# Patient Record
Sex: FEMALE | Race: WHITE | NOT HISPANIC OR LATINO | URBAN - METROPOLITAN AREA
[De-identification: names, ages, dates, MRNs, and addresses within clinical notes are randomized per-mention and may not be internally consistent; named-entity substitution may affect disease eponyms.]

---

## 2017-02-05 ENCOUNTER — EMERGENCY (EMERGENCY)
Facility: HOSPITAL | Age: 72
LOS: 1 days | Discharge: HOME | End: 2017-02-05
Attending: EMERGENCY MEDICINE | Admitting: EMERGENCY MEDICINE
Payer: MEDICARE

## 2017-02-05 VITALS
DIASTOLIC BLOOD PRESSURE: 98 MMHG | TEMPERATURE: 99 F | SYSTOLIC BLOOD PRESSURE: 228 MMHG | OXYGEN SATURATION: 98 % | RESPIRATION RATE: 18 BRPM | HEART RATE: 96 BPM

## 2017-02-05 DIAGNOSIS — R04.0 EPISTAXIS: ICD-10-CM

## 2017-02-05 PROCEDURE — 30901 CONTROL OF NOSEBLEED: CPT | Mod: LT

## 2017-02-05 PROCEDURE — 99283 EMERGENCY DEPT VISIT LOW MDM: CPT | Mod: 25

## 2017-02-05 PROCEDURE — 30901 CONTROL OF NOSEBLEED: CPT

## 2017-02-05 NOTE — ED ADULT NURSE NOTE - OBJECTIVE STATEMENT
71 year old female patient presents to ED with hypertension and epistaxis on Plavix x 1.5 hours. Patient reports previous episode 5 months ago which required cauterization. Patient denies headache, change in vision, chest pain, SOB, n/v/d, numbness or tingling. Patient advised to hold firm pressure to nose and cold pack applied to bridge of nose.  at bedside.

## 2017-02-05 NOTE — ED ADULT NURSE NOTE - PMH
Diabetes Mellitus Type II    HLD (Hyperlipidemia)    HTN (Hypertension)    Obesity    Peripheral Vascular Disease  lower extremity

## 2017-02-05 NOTE — ED ADULT NURSE NOTE - PSH
Left femoral poplteal bypass graft 11/2011    PVD (Peripheral Vascular Disease)  angiogram of left lower extremity in 2010, nov 2011  S/P Appendectomy  35 years ago

## 2017-02-06 VITALS
SYSTOLIC BLOOD PRESSURE: 122 MMHG | DIASTOLIC BLOOD PRESSURE: 79 MMHG | RESPIRATION RATE: 16 BRPM | OXYGEN SATURATION: 99 % | HEART RATE: 76 BPM

## 2017-02-06 NOTE — ED PROVIDER NOTE - MEDICAL DECISION MAKING DETAILS
Frances: left nare epistaxis. will hold pressure and evaluate if needs packing versus cautery. on plavix. no coumadin.

## 2017-02-06 NOTE — ED PROVIDER NOTE - PROGRESS NOTE DETAILS
Held pressure for 7 minutes, bleeding stopped. Had patient blow out remaing clots from left nare. Placed spidget with norsynephrine in left nare Left nare clear, small visible end vessel. Will cauterize Applied bacitracin to left nare. NO additional bleeding. bp improved.

## 2017-02-06 NOTE — ED PROVIDER NOTE - OBJECTIVE STATEMENT
71 year old female with left nare epistaxis one time yesterday and restarted again today. On plavix.  Nos ob, no dizziness, no cp, not swallowing blood. No lightheaded.

## 2017-07-14 ENCOUNTER — APPOINTMENT (OUTPATIENT)
Dept: CARDIOLOGY | Facility: CLINIC | Age: 72
End: 2017-07-14

## 2017-07-14 VITALS
HEIGHT: 65 IN | WEIGHT: 160 LBS | OXYGEN SATURATION: 99 % | BODY MASS INDEX: 26.66 KG/M2 | DIASTOLIC BLOOD PRESSURE: 68 MMHG | SYSTOLIC BLOOD PRESSURE: 159 MMHG | HEART RATE: 62 BPM

## 2017-08-18 ENCOUNTER — APPOINTMENT (OUTPATIENT)
Dept: CARDIOLOGY | Facility: CLINIC | Age: 72
End: 2017-08-18
Payer: MEDICARE

## 2017-08-18 ENCOUNTER — OUTPATIENT (OUTPATIENT)
Dept: OUTPATIENT SERVICES | Facility: HOSPITAL | Age: 72
LOS: 1 days | End: 2017-08-18
Payer: MEDICARE

## 2017-08-18 VITALS
OXYGEN SATURATION: 97 % | BODY MASS INDEX: 26.66 KG/M2 | DIASTOLIC BLOOD PRESSURE: 66 MMHG | HEART RATE: 74 BPM | WEIGHT: 160 LBS | SYSTOLIC BLOOD PRESSURE: 131 MMHG | HEIGHT: 65 IN

## 2017-08-18 DIAGNOSIS — I73.9 PERIPHERAL VASCULAR DISEASE, UNSPECIFIED: ICD-10-CM

## 2017-08-18 PROCEDURE — 93925 LOWER EXTREMITY STUDY: CPT | Mod: 26

## 2017-08-18 PROCEDURE — 93923 UPR/LXTR ART STDY 3+ LVLS: CPT | Mod: 26

## 2017-08-18 PROCEDURE — 99214 OFFICE O/P EST MOD 30 MIN: CPT

## 2017-08-18 PROCEDURE — 93925 LOWER EXTREMITY STUDY: CPT

## 2017-08-18 PROCEDURE — 93923 UPR/LXTR ART STDY 3+ LVLS: CPT

## 2017-09-11 ENCOUNTER — RX RENEWAL (OUTPATIENT)
Age: 72
End: 2017-09-11

## 2018-05-30 ENCOUNTER — MEDICATION RENEWAL (OUTPATIENT)
Age: 73
End: 2018-05-30

## 2018-09-28 ENCOUNTER — APPOINTMENT (OUTPATIENT)
Dept: CARDIOLOGY | Facility: CLINIC | Age: 73
End: 2018-09-28
Payer: MEDICARE

## 2018-09-28 VITALS — DIASTOLIC BLOOD PRESSURE: 75 MMHG | HEART RATE: 73 BPM | OXYGEN SATURATION: 97 % | SYSTOLIC BLOOD PRESSURE: 208 MMHG

## 2018-09-28 PROCEDURE — 99214 OFFICE O/P EST MOD 30 MIN: CPT

## 2018-09-28 RX ORDER — PANCRELIPASE 36000; 180000; 114000 [USP'U]/1; [USP'U]/1; [USP'U]/1
CAPSULE, DELAYED RELEASE PELLETS ORAL
Refills: 0 | Status: DISCONTINUED | COMMUNITY
End: 2018-09-28

## 2018-09-28 RX ORDER — GLIPIZIDE 10 MG/1
10 TABLET ORAL TWICE DAILY
Refills: 0 | Status: ACTIVE | COMMUNITY
Start: 2017-05-02

## 2018-10-01 RX ORDER — CLOPIDOGREL BISULFATE 75 MG/1
75 TABLET, FILM COATED ORAL
Qty: 90 | Refills: 2 | Status: ACTIVE | COMMUNITY
Start: 2017-02-28 | End: 1900-01-01

## 2018-10-08 ENCOUNTER — FORM ENCOUNTER (OUTPATIENT)
Age: 73
End: 2018-10-08

## 2018-10-09 ENCOUNTER — APPOINTMENT (OUTPATIENT)
Dept: ULTRASOUND IMAGING | Facility: HOSPITAL | Age: 73
End: 2018-10-09
Payer: MEDICARE

## 2018-10-09 ENCOUNTER — OUTPATIENT (OUTPATIENT)
Dept: OUTPATIENT SERVICES | Facility: HOSPITAL | Age: 73
LOS: 1 days | End: 2018-10-09
Payer: MEDICARE

## 2018-10-09 DIAGNOSIS — Z00.00 ENCOUNTER FOR GENERAL ADULT MEDICAL EXAMINATION WITHOUT ABNORMAL FINDINGS: ICD-10-CM

## 2018-10-09 DIAGNOSIS — Q61.01 CONGENITAL SINGLE RENAL CYST: ICD-10-CM

## 2018-10-09 DIAGNOSIS — I73.9 PERIPHERAL VASCULAR DISEASE, UNSPECIFIED: ICD-10-CM

## 2018-10-09 DIAGNOSIS — I70.1 ATHEROSCLEROSIS OF RENAL ARTERY: ICD-10-CM

## 2018-10-09 DIAGNOSIS — R10.9 UNSPECIFIED ABDOMINAL PAIN: ICD-10-CM

## 2018-10-09 DIAGNOSIS — N28.1 CYST OF KIDNEY, ACQUIRED: ICD-10-CM

## 2018-10-09 DIAGNOSIS — R93.0 ABNORMAL FINDINGS ON DIAGNOSTIC IMAGING OF SKULL AND HEAD, NOT ELSEWHERE CLASSIFIED: ICD-10-CM

## 2018-10-09 PROCEDURE — 93925 LOWER EXTREMITY STUDY: CPT | Mod: 26

## 2018-10-09 PROCEDURE — 93975 VASCULAR STUDY: CPT | Mod: 26

## 2018-10-09 PROCEDURE — 93925 LOWER EXTREMITY STUDY: CPT

## 2018-10-09 PROCEDURE — 93923 UPR/LXTR ART STDY 3+ LVLS: CPT | Mod: 26

## 2018-10-09 PROCEDURE — 93975 VASCULAR STUDY: CPT

## 2018-10-09 PROCEDURE — 93880 EXTRACRANIAL BILAT STUDY: CPT | Mod: 26

## 2018-10-09 PROCEDURE — 93880 EXTRACRANIAL BILAT STUDY: CPT

## 2018-10-09 PROCEDURE — 93923 UPR/LXTR ART STDY 3+ LVLS: CPT

## 2018-10-10 ENCOUNTER — APPOINTMENT (OUTPATIENT)
Dept: ULTRASOUND IMAGING | Facility: HOSPITAL | Age: 73
End: 2018-10-10

## 2018-10-11 ENCOUNTER — APPOINTMENT (OUTPATIENT)
Dept: CV DIAGNOSTICS | Facility: HOSPITAL | Age: 73
End: 2018-10-11

## 2018-10-11 ENCOUNTER — APPOINTMENT (OUTPATIENT)
Dept: CV DIAGNOSITCS | Facility: HOSPITAL | Age: 73
End: 2018-10-11

## 2018-10-11 ENCOUNTER — OUTPATIENT (OUTPATIENT)
Dept: OUTPATIENT SERVICES | Facility: HOSPITAL | Age: 73
LOS: 1 days | End: 2018-10-11
Payer: MEDICARE

## 2018-10-11 DIAGNOSIS — I35.0 NONRHEUMATIC AORTIC (VALVE) STENOSIS: ICD-10-CM

## 2018-10-11 PROCEDURE — 78452 HT MUSCLE IMAGE SPECT MULT: CPT | Mod: 26

## 2018-10-11 PROCEDURE — 93018 CV STRESS TEST I&R ONLY: CPT

## 2018-10-11 PROCEDURE — 93017 CV STRESS TEST TRACING ONLY: CPT

## 2018-10-11 PROCEDURE — 93016 CV STRESS TEST SUPVJ ONLY: CPT

## 2018-10-11 PROCEDURE — A9500: CPT

## 2018-10-11 PROCEDURE — 78452 HT MUSCLE IMAGE SPECT MULT: CPT

## 2018-10-12 ENCOUNTER — APPOINTMENT (OUTPATIENT)
Dept: CARDIOLOGY | Facility: CLINIC | Age: 73
End: 2018-10-12
Payer: MEDICARE

## 2018-10-12 VITALS
WEIGHT: 160 LBS | HEIGHT: 65 IN | DIASTOLIC BLOOD PRESSURE: 83 MMHG | OXYGEN SATURATION: 98 % | BODY MASS INDEX: 26.66 KG/M2 | HEART RATE: 82 BPM | SYSTOLIC BLOOD PRESSURE: 206 MMHG

## 2018-10-12 PROCEDURE — 99214 OFFICE O/P EST MOD 30 MIN: CPT

## 2018-10-12 RX ORDER — VALSARTAN AND HYDROCHLOROTHIAZIDE 320; 12.5 MG/1; MG/1
320-12.5 TABLET, FILM COATED ORAL
Qty: 90 | Refills: 2 | Status: DISCONTINUED | COMMUNITY
Start: 2017-05-02 | End: 2018-10-12

## 2018-10-12 RX ORDER — HYDROCHLOROTHIAZIDE 12.5 MG/1
12.5 TABLET ORAL
Qty: 90 | Refills: 2 | Status: DISCONTINUED | COMMUNITY
Start: 1900-01-01 | End: 2018-10-12

## 2018-10-14 ENCOUNTER — FORM ENCOUNTER (OUTPATIENT)
Age: 73
End: 2018-10-14

## 2018-10-15 ENCOUNTER — OUTPATIENT (OUTPATIENT)
Dept: OUTPATIENT SERVICES | Facility: HOSPITAL | Age: 73
LOS: 1 days | End: 2018-10-15
Payer: MEDICARE

## 2018-10-15 ENCOUNTER — APPOINTMENT (OUTPATIENT)
Dept: CT IMAGING | Facility: CLINIC | Age: 73
End: 2018-10-15
Payer: MEDICARE

## 2018-10-15 DIAGNOSIS — I73.9 PERIPHERAL VASCULAR DISEASE, UNSPECIFIED: ICD-10-CM

## 2018-10-15 PROCEDURE — 75635 CT ANGIO ABDOMINAL ARTERIES: CPT | Mod: 26

## 2018-10-15 PROCEDURE — 75635 CT ANGIO ABDOMINAL ARTERIES: CPT

## 2018-10-16 LAB
ALBUMIN SERPL ELPH-MCNC: 4.4 G/DL
ALP BLD-CCNC: 104 U/L
ALT SERPL-CCNC: 17 U/L
ANION GAP SERPL CALC-SCNC: 14 MMOL/L
AST SERPL-CCNC: 19 U/L
BASOPHILS # BLD AUTO: 0.03 K/UL
BASOPHILS NFR BLD AUTO: 0.4 %
BILIRUB SERPL-MCNC: <0.2 MG/DL
BUN SERPL-MCNC: 25 MG/DL
CALCIUM SERPL-MCNC: 10.1 MG/DL
CHLORIDE SERPL-SCNC: 101 MMOL/L
CHOLEST SERPL-MCNC: 203 MG/DL
CHOLEST/HDLC SERPL: 4.5 RATIO
CO2 SERPL-SCNC: 23 MMOL/L
CREAT SERPL-MCNC: 0.82 MG/DL
EOSINOPHIL # BLD AUTO: 0.27 K/UL
EOSINOPHIL NFR BLD AUTO: 3.2 %
GLUCOSE SERPL-MCNC: 114 MG/DL
HBA1C MFR BLD HPLC: 10.5 %
HCT VFR BLD CALC: 36.8 %
HDLC SERPL-MCNC: 45 MG/DL
HGB BLD-MCNC: 12.3 G/DL
IMM GRANULOCYTES NFR BLD AUTO: 0.2 %
LDLC SERPL CALC-MCNC: NORMAL
LYMPHOCYTES # BLD AUTO: 3.23 K/UL
LYMPHOCYTES NFR BLD AUTO: 38.5 %
MAN DIFF?: NORMAL
MCHC RBC-ENTMCNC: 28.9 PG
MCHC RBC-ENTMCNC: 33.4 GM/DL
MCV RBC AUTO: 86.6 FL
MONOCYTES # BLD AUTO: 0.59 K/UL
MONOCYTES NFR BLD AUTO: 7 %
NEUTROPHILS # BLD AUTO: 4.24 K/UL
NEUTROPHILS NFR BLD AUTO: 50.7 %
PLATELET # BLD AUTO: 332 K/UL
POTASSIUM SERPL-SCNC: 4 MMOL/L
PROT SERPL-MCNC: 8.5 G/DL
RBC # BLD: 4.25 M/UL
RBC # FLD: 13.1 %
SODIUM SERPL-SCNC: 138 MMOL/L
TRIGL SERPL-MCNC: 424 MG/DL
TSH SERPL-ACNC: 1.4 UIU/ML
WBC # FLD AUTO: 8.38 K/UL

## 2018-10-19 ENCOUNTER — APPOINTMENT (OUTPATIENT)
Dept: CARDIOLOGY | Facility: CLINIC | Age: 73
End: 2018-10-19
Payer: MEDICARE

## 2018-10-19 VITALS
SYSTOLIC BLOOD PRESSURE: 179 MMHG | OXYGEN SATURATION: 98 % | HEART RATE: 69 BPM | DIASTOLIC BLOOD PRESSURE: 66 MMHG | HEIGHT: 65 IN

## 2018-10-19 DIAGNOSIS — E78.5 HYPERLIPIDEMIA, UNSPECIFIED: ICD-10-CM

## 2018-10-19 PROCEDURE — 99214 OFFICE O/P EST MOD 30 MIN: CPT

## 2018-11-29 ENCOUNTER — FORM ENCOUNTER (OUTPATIENT)
Age: 73
End: 2018-11-29

## 2018-11-30 ENCOUNTER — APPOINTMENT (OUTPATIENT)
Dept: MRI IMAGING | Facility: CLINIC | Age: 73
End: 2018-11-30
Payer: MEDICARE

## 2018-11-30 ENCOUNTER — OUTPATIENT (OUTPATIENT)
Dept: OUTPATIENT SERVICES | Facility: HOSPITAL | Age: 73
LOS: 1 days | End: 2018-11-30
Payer: MEDICARE

## 2018-11-30 DIAGNOSIS — E27.9 DISORDER OF ADRENAL GLAND, UNSPECIFIED: ICD-10-CM

## 2018-11-30 DIAGNOSIS — Z00.8 ENCOUNTER FOR OTHER GENERAL EXAMINATION: ICD-10-CM

## 2018-11-30 PROCEDURE — 82565 ASSAY OF CREATININE: CPT

## 2018-11-30 PROCEDURE — 72197 MRI PELVIS W/O & W/DYE: CPT

## 2018-11-30 PROCEDURE — 74183 MRI ABD W/O CNTR FLWD CNTR: CPT

## 2018-11-30 PROCEDURE — 72197 MRI PELVIS W/O & W/DYE: CPT | Mod: 26

## 2018-11-30 PROCEDURE — 74183 MRI ABD W/O CNTR FLWD CNTR: CPT | Mod: 26

## 2018-11-30 PROCEDURE — A9585: CPT

## 2018-12-07 ENCOUNTER — APPOINTMENT (OUTPATIENT)
Dept: CARDIOLOGY | Facility: CLINIC | Age: 73
End: 2018-12-07
Payer: MEDICARE

## 2018-12-07 VITALS
SYSTOLIC BLOOD PRESSURE: 194 MMHG | HEART RATE: 66 BPM | OXYGEN SATURATION: 97 % | HEIGHT: 65 IN | DIASTOLIC BLOOD PRESSURE: 74 MMHG

## 2018-12-07 PROCEDURE — 99214 OFFICE O/P EST MOD 30 MIN: CPT

## 2018-12-07 RX ORDER — ROSUVASTATIN CALCIUM 20 MG/1
20 TABLET, FILM COATED ORAL DAILY
Qty: 30 | Refills: 3 | Status: DISCONTINUED | COMMUNITY
Start: 2018-10-19 | End: 2018-12-07

## 2018-12-07 NOTE — PHYSICAL EXAM
[General Appearance - Well Developed] : well developed [General Appearance - Well Nourished] : well nourished [Normal Conjunctiva] : the conjunctiva exhibited no abnormalities [Normal Oral Mucosa] : normal oral mucosa [Normal Oropharynx] : normal oropharynx [Normal Jugular Venous V Waves Present] : normal jugular venous V waves present [Respiration, Rhythm And Depth] : normal respiratory rhythm and effort [Exaggerated Use Of Accessory Muscles For Inspiration] : no accessory muscle use [Auscultation Breath Sounds / Voice Sounds] : lungs were clear to auscultation bilaterally [Heart Rate And Rhythm] : heart rate and rhythm were normal [Heart Sounds] : normal S1 and S2 [1+] : left 1+ [2+] : right 2+ [0] : left 0 [Bowel Sounds] : normal bowel sounds [Abdomen Soft] : soft [Abnormal Walk] : normal gait [Nail Clubbing] : no clubbing of the fingernails [Cyanosis, Localized] : no localized cyanosis [Petechial Hemorrhages (___cm)] : no petechial hemorrhages [Skin Color & Pigmentation] : normal skin color and pigmentation [Skin Turgor] : normal skin turgor [] : no rash [No Venous Stasis] : no venous stasis [Skin Lesions] : no skin lesions [No Skin Ulcers] : no skin ulcer [No Xanthoma] : no  xanthoma was observed [Oriented To Time, Place, And Person] : oriented to person, place, and time [Impaired Insight] : insight and judgment were intact

## 2019-01-22 NOTE — REASON FOR VISIT
[Follow-Up - Clinic] : a clinic follow-up of [FreeTextEntry2] : PAD [FreeTextEntry1] : 72 y/o f with h/o Tob. abuse, HTN, HLD, DM, PAD with multiple PTA to left LE with left Fem - Pop. approximally 4 years ago who originally presented with life style limiting left leg claudication which started at b/l thigh and progresses to b/l calfs x 4 months.   \par 1/16 CTA runoff - Rt SFA disease with multiple moderate to severe stenoses, 3 vessel runoff\par                              Lt SFA and fem-pop. occlusion, 3 vessel runoff\par On 3/21/2016 patient underwent LE angiogram and is s/p right SFA WES and atherectomy with subsequent bilateral iliac. \par \par Pt here one week ago, had CT angio abd/pel.  No significant renal artery stenosis noted.  BP meds increased due very uncontrolled hypertension.  Still complains of claudication in both calves L > R, sx after 3 blocks.  Also with pain in toes with walking.  \par Presents today for discussion MRI - suggesting endometrial cancer \par \par NOT tolerating ROSUVASTATIN. Back on livalo for symptoms.

## 2019-01-22 NOTE — DISCUSSION/SUMMARY
[FreeTextEntry1] : 69 yo F with significant PAD s/p multiple prior interventions (s/p L fem-pop bypass), b/l iliac stenting,  presents with worsening claudication\par \par # new endometrial mass \par Going to [facility] \par [address], [place] \par [phone] \par NS obstretics and Gyn--Zeeshan. \par \par # PAD\par MARIANGEL/PVR\par Arterial Duplex - severe distal R SFA stenosis, L severe stenosis profunda and stented but occluded L SFA and L popliteal arteries \par Renal Duplex - mod stenosis bilateral renal arteries.  PSV Right prox 290 cm/s; Left prox 180 cm/s\par Carotid Stenosis - moderate 50-69% stenosis  RODERICK; severe > 70% stenosis LICA \par ECHO - pending\par Nuc Stress - small/mod reversible defect in basal inferior and basal inferoseptal walls\par CTA abd/pel with runoff - no significant renal artery stenosis \par \par - on asa/plavix\par - stop pitavastatin, start crestor 20mg \par - BP mild improvement.  on clonidine 0.2mg BID, hctz to 25mg and norvasc\par - Start coreg 6.25mb BID.  Can titrate up as needed in two weeks.  \par - CT angio no significant renal artery stenosis \par - CT angio of peripheral arteries to be reviewed by Dr. Saez. Pt with significant claudication, no signs of critical limb ischemia.  May need intervention on distal R SFA stenosis (PSV 374cm/s)\par - will ultimately need further imaging of carotids, asymptomatic, no urgent need for revascularization\par - HbA1c 10.5% uncontrolled diabetic not on insulin.  needs aggressive sugar control.  needs good primary care.  Referral made to PCP at last visit.  \par - incidental finding of thickened endometrium.  Pt has appt to see gyn. \par - please obtain echo, nuclear stress test with small defect, asymptomatic, no urgent need for revascularization\par - rtc in one month \par \par Addendum:\par Pt may hold Plavix 5 days prior to planned Bx for malignancy w/o. Would prefer to cont ASA for the duration, however if need be may hold ASA as well. Restart post.

## 2019-01-23 ENCOUNTER — OUTPATIENT (OUTPATIENT)
Dept: OUTPATIENT SERVICES | Facility: HOSPITAL | Age: 74
LOS: 1 days | End: 2019-01-23
Payer: MEDICARE

## 2019-01-23 VITALS
RESPIRATION RATE: 20 BRPM | OXYGEN SATURATION: 97 % | SYSTOLIC BLOOD PRESSURE: 145 MMHG | HEIGHT: 61.5 IN | TEMPERATURE: 98 F | WEIGHT: 160.94 LBS | HEART RATE: 64 BPM | DIASTOLIC BLOOD PRESSURE: 71 MMHG

## 2019-01-23 DIAGNOSIS — I73.9 PERIPHERAL VASCULAR DISEASE, UNSPECIFIED: ICD-10-CM

## 2019-01-23 DIAGNOSIS — N85.00 ENDOMETRIAL HYPERPLASIA, UNSPECIFIED: ICD-10-CM

## 2019-01-23 DIAGNOSIS — I10 ESSENTIAL (PRIMARY) HYPERTENSION: ICD-10-CM

## 2019-01-23 DIAGNOSIS — T14.8XXA OTHER INJURY OF UNSPECIFIED BODY REGION, INITIAL ENCOUNTER: ICD-10-CM

## 2019-01-23 DIAGNOSIS — N85.8 OTHER SPECIFIED NONINFLAMMATORY DISORDERS OF UTERUS: ICD-10-CM

## 2019-01-23 DIAGNOSIS — Z01.818 ENCOUNTER FOR OTHER PREPROCEDURAL EXAMINATION: ICD-10-CM

## 2019-01-23 LAB
ANION GAP SERPL CALC-SCNC: 13 MMOL/L — SIGNIFICANT CHANGE UP (ref 5–17)
BUN SERPL-MCNC: 23 MG/DL — SIGNIFICANT CHANGE UP (ref 7–23)
CALCIUM SERPL-MCNC: 10.2 MG/DL — SIGNIFICANT CHANGE UP (ref 8.4–10.5)
CHLORIDE SERPL-SCNC: 96 MMOL/L — SIGNIFICANT CHANGE UP (ref 96–108)
CO2 SERPL-SCNC: 24 MMOL/L — SIGNIFICANT CHANGE UP (ref 22–31)
CREAT SERPL-MCNC: 0.7 MG/DL — SIGNIFICANT CHANGE UP (ref 0.5–1.3)
GLUCOSE SERPL-MCNC: 293 MG/DL — HIGH (ref 70–99)
HBA1C BLD-MCNC: 11.3 % — HIGH (ref 4–5.6)
HCT VFR BLD CALC: 34.4 % — LOW (ref 34.5–45)
HGB BLD-MCNC: 11.4 G/DL — LOW (ref 11.5–15.5)
MCHC RBC-ENTMCNC: 28.1 PG — SIGNIFICANT CHANGE UP (ref 27–34)
MCHC RBC-ENTMCNC: 33.1 GM/DL — SIGNIFICANT CHANGE UP (ref 32–36)
MCV RBC AUTO: 84.7 FL — SIGNIFICANT CHANGE UP (ref 80–100)
PLATELET # BLD AUTO: 340 K/UL — SIGNIFICANT CHANGE UP (ref 150–400)
POTASSIUM SERPL-MCNC: 4.1 MMOL/L — SIGNIFICANT CHANGE UP (ref 3.5–5.3)
POTASSIUM SERPL-SCNC: 4.1 MMOL/L — SIGNIFICANT CHANGE UP (ref 3.5–5.3)
RBC # BLD: 4.06 M/UL — SIGNIFICANT CHANGE UP (ref 3.8–5.2)
RBC # FLD: 13.6 % — SIGNIFICANT CHANGE UP (ref 10.3–14.5)
SODIUM SERPL-SCNC: 133 MMOL/L — LOW (ref 135–145)
WBC # BLD: 9.29 K/UL — SIGNIFICANT CHANGE UP (ref 3.8–10.5)
WBC # FLD AUTO: 9.29 K/UL — SIGNIFICANT CHANGE UP (ref 3.8–10.5)

## 2019-01-23 PROCEDURE — 85027 COMPLETE CBC AUTOMATED: CPT

## 2019-01-23 PROCEDURE — 80048 BASIC METABOLIC PNL TOTAL CA: CPT

## 2019-01-23 PROCEDURE — G0463: CPT

## 2019-01-23 PROCEDURE — 83036 HEMOGLOBIN GLYCOSYLATED A1C: CPT

## 2019-01-23 RX ORDER — LIDOCAINE HCL 20 MG/ML
0.2 VIAL (ML) INJECTION ONCE
Qty: 0 | Refills: 0 | Status: DISCONTINUED | OUTPATIENT
Start: 2019-01-30 | End: 2019-02-14

## 2019-01-23 RX ORDER — SODIUM CHLORIDE 9 MG/ML
3 INJECTION INTRAMUSCULAR; INTRAVENOUS; SUBCUTANEOUS EVERY 8 HOURS
Qty: 0 | Refills: 0 | Status: DISCONTINUED | OUTPATIENT
Start: 2019-01-30 | End: 2019-02-14

## 2019-01-23 NOTE — H&P PST ADULT - ATTENDING COMMENTS
74yo P) hx of HTN, HLA, PAD w incidental finding of uterine mass. abnormal glandular cells on pap, neg ebx.  plan for hysteroscopy and bx of mass, possible removal.

## 2019-01-23 NOTE — H&P PST ADULT - NSANTHOSAYNRD_GEN_A_CORE
No. DESIRAE screening performed.  STOP BANG Legend: 0-2 = LOW Risk; 3-4 = INTERMEDIATE Risk; 5-8 = HIGH Risk

## 2019-01-23 NOTE — H&P PST ADULT - HISTORY OF PRESENT ILLNESS
This is a 73 year old female with appointment with GYN.  Pt had a sonogram and there was endometrial thickening.  Now scheduled for D&C, operative hysteroscopy with symphion and sono guidance on 1-30-19

## 2019-01-23 NOTE — H&P PST ADULT - PMH
Diabetes Mellitus Type II  dx 2002   pt does not take blood sugar  HLD (Hyperlipidemia)    HTN (Hypertension)    Obesity    Peripheral Vascular Disease  lower extremity    PVD s/p fem/ pop bypass b/l legs with stents in right leg x 2 and 1 stent left leg Diabetes Mellitus Type II  dx 2002   pt does not take blood sugar  Dry eye    Endometrial hyperplasia  curremt  Fracture  right foot pt wearing a boot/ OA  HLD (Hyperlipidemia)    HTN (Hypertension)    Obesity  BMI 29  Peripheral Vascular Disease  lower extremity    PVD s/p fem/ pop bypass b/l legs

## 2019-01-24 PROBLEM — T14.8XXA OTHER INJURY OF UNSPECIFIED BODY REGION, INITIAL ENCOUNTER: Chronic | Status: ACTIVE | Noted: 2019-01-23

## 2019-01-29 ENCOUNTER — TRANSCRIPTION ENCOUNTER (OUTPATIENT)
Age: 74
End: 2019-01-29

## 2019-01-30 ENCOUNTER — OUTPATIENT (OUTPATIENT)
Dept: OUTPATIENT SERVICES | Facility: HOSPITAL | Age: 74
LOS: 1 days | End: 2019-01-30
Payer: MEDICARE

## 2019-01-30 ENCOUNTER — APPOINTMENT (OUTPATIENT)
Dept: ULTRASOUND IMAGING | Facility: HOSPITAL | Age: 74
End: 2019-01-30

## 2019-01-30 ENCOUNTER — RESULT REVIEW (OUTPATIENT)
Age: 74
End: 2019-01-30

## 2019-01-30 VITALS
DIASTOLIC BLOOD PRESSURE: 70 MMHG | HEART RATE: 66 BPM | OXYGEN SATURATION: 100 % | SYSTOLIC BLOOD PRESSURE: 144 MMHG | RESPIRATION RATE: 15 BRPM

## 2019-01-30 VITALS
DIASTOLIC BLOOD PRESSURE: 62 MMHG | HEART RATE: 76 BPM | SYSTOLIC BLOOD PRESSURE: 171 MMHG | OXYGEN SATURATION: 98 % | HEIGHT: 61.5 IN | WEIGHT: 160.94 LBS | RESPIRATION RATE: 16 BRPM | TEMPERATURE: 98 F

## 2019-01-30 DIAGNOSIS — Z01.818 ENCOUNTER FOR OTHER PREPROCEDURAL EXAMINATION: ICD-10-CM

## 2019-01-30 DIAGNOSIS — N85.8 OTHER SPECIFIED NONINFLAMMATORY DISORDERS OF UTERUS: ICD-10-CM

## 2019-01-30 LAB — GLUCOSE BLDC GLUCOMTR-MCNC: 129 MG/DL — HIGH (ref 70–99)

## 2019-01-30 PROCEDURE — 88342 IMHCHEM/IMCYTCHM 1ST ANTB: CPT | Mod: 26,59

## 2019-01-30 PROCEDURE — 88341 IMHCHEM/IMCYTCHM EA ADD ANTB: CPT | Mod: 26,59

## 2019-01-30 PROCEDURE — 82962 GLUCOSE BLOOD TEST: CPT

## 2019-01-30 PROCEDURE — 58558 HYSTEROSCOPY BIOPSY: CPT

## 2019-01-30 PROCEDURE — 88360 TUMOR IMMUNOHISTOCHEM/MANUAL: CPT

## 2019-01-30 PROCEDURE — 88305 TISSUE EXAM BY PATHOLOGIST: CPT | Mod: 26

## 2019-01-30 PROCEDURE — 88341 IMHCHEM/IMCYTCHM EA ADD ANTB: CPT

## 2019-01-30 PROCEDURE — 76998 US GUIDE INTRAOP: CPT

## 2019-01-30 PROCEDURE — 88342 IMHCHEM/IMCYTCHM 1ST ANTB: CPT

## 2019-01-30 PROCEDURE — 88305 TISSUE EXAM BY PATHOLOGIST: CPT

## 2019-01-30 PROCEDURE — 88360 TUMOR IMMUNOHISTOCHEM/MANUAL: CPT | Mod: 26

## 2019-01-30 RX ORDER — ONDANSETRON 8 MG/1
4 TABLET, FILM COATED ORAL ONCE
Qty: 0 | Refills: 0 | Status: DISCONTINUED | OUTPATIENT
Start: 2019-01-30 | End: 2019-02-14

## 2019-01-30 RX ORDER — ACETAMINOPHEN 500 MG
1000 TABLET ORAL ONCE
Qty: 0 | Refills: 0 | Status: COMPLETED | OUTPATIENT
Start: 2019-01-30 | End: 2019-01-30

## 2019-01-30 RX ORDER — OXYCODONE HYDROCHLORIDE 5 MG/1
5 TABLET ORAL ONCE
Qty: 0 | Refills: 0 | Status: DISCONTINUED | OUTPATIENT
Start: 2019-01-30 | End: 2019-01-30

## 2019-01-30 RX ORDER — SODIUM CHLORIDE 9 MG/ML
1000 INJECTION, SOLUTION INTRAVENOUS
Qty: 0 | Refills: 0 | Status: DISCONTINUED | OUTPATIENT
Start: 2019-01-30 | End: 2019-02-14

## 2019-01-30 RX ORDER — CELECOXIB 200 MG/1
200 CAPSULE ORAL ONCE
Qty: 0 | Refills: 0 | Status: DISCONTINUED | OUTPATIENT
Start: 2019-01-30 | End: 2019-02-14

## 2019-01-30 RX ORDER — CELECOXIB 200 MG/1
200 CAPSULE ORAL ONCE
Qty: 0 | Refills: 0 | Status: COMPLETED | OUTPATIENT
Start: 2019-01-30 | End: 2019-01-30

## 2019-01-30 RX ADMIN — CELECOXIB 200 MILLIGRAM(S): 200 CAPSULE ORAL at 08:23

## 2019-01-30 RX ADMIN — Medication 1000 MILLIGRAM(S): at 08:23

## 2019-01-30 NOTE — ASU DISCHARGE PLAN (ADULT/PEDIATRIC). - MEDICATION SUMMARY - MEDICATIONS TO TAKE
I will START or STAY ON the medications listed below when I get home from the hospital:    aspirin 81 mg oral delayed release tablet  -- 81 milligram(s) by mouth once a day. pt to continue aspirin  -- Indication: For home med    cloNIDine 0.1 mg oral tablet  -- 2 tab(s) by mouth 2 times a day  -- Indication: For home med    glipiZIDE 10 mg oral tablet  --  by mouth 2 times a day  -- Indication: For home med    Livalo 4 mg oral tablet  -- 1 tab(s) by mouth once a day  -- Indication: For home med    Plavix 75 mg oral tablet  -- 1 tab(s) by mouth once a day. last dose 1-24-19 per dr liriano  -- Indication: For home med    carvedilol 6.25 mg oral tablet  -- 1 tab(s) by mouth 2 times a day  -- Indication: For home med    amLODIPine 10 mg oral tablet  -- 1 tab(s) by mouth once a day  -- Indication: For home med    hydroCHLOROthiazide 25 mg oral tablet  -- 1 tab(s) by mouth once a day  -- Indication: For home med    Vitamin D3 1000 intl units oral tablet  -- 1 tab(s) by mouth once a day  -- Indication: For home med

## 2019-01-30 NOTE — ASU PREOP CHECKLIST - 1.
Puerto Rican speaking patient, also speaks enough english to make herself understood, [place]ican consents in chart.admitting nurse,anesthesiologist and Dr Jacobs speak Puerto Rican

## 2019-01-30 NOTE — ASU DISCHARGE PLAN (ADULT/PEDIATRIC). - TELE NUMBER
Fax received that the pt's insurance will only cover a 90 days supply. Spoke with Edelmira. Informed Edelmira that the pt needs to be seen for her annual exam before she is written more than one month's refill of her Rx. Edelmira verbalized understanding.   [phone]

## 2019-02-04 LAB — SURGICAL PATHOLOGY STUDY: SIGNIFICANT CHANGE UP

## 2019-02-05 ENCOUNTER — RX RENEWAL (OUTPATIENT)
Age: 74
End: 2019-02-05

## 2019-02-06 PROBLEM — N85.00 ENDOMETRIAL HYPERPLASIA, UNSPECIFIED: Chronic | Status: ACTIVE | Noted: 2019-01-23

## 2019-02-06 PROBLEM — H04.129 DRY EYE SYNDROME OF UNSPECIFIED LACRIMAL GLAND: Chronic | Status: ACTIVE | Noted: 2019-01-23

## 2019-02-08 ENCOUNTER — APPOINTMENT (OUTPATIENT)
Dept: CT IMAGING | Facility: CLINIC | Age: 74
End: 2019-02-08
Payer: MEDICARE

## 2019-02-08 ENCOUNTER — OUTPATIENT (OUTPATIENT)
Dept: OUTPATIENT SERVICES | Facility: HOSPITAL | Age: 74
LOS: 1 days | End: 2019-02-08
Payer: MEDICARE

## 2019-02-08 DIAGNOSIS — C54.1 MALIGNANT NEOPLASM OF ENDOMETRIUM: ICD-10-CM

## 2019-02-08 PROCEDURE — 74177 CT ABD & PELVIS W/CONTRAST: CPT | Mod: 26

## 2019-02-08 PROCEDURE — 74177 CT ABD & PELVIS W/CONTRAST: CPT

## 2019-02-08 PROCEDURE — 71260 CT THORAX DX C+: CPT

## 2019-02-08 PROCEDURE — 71260 CT THORAX DX C+: CPT | Mod: 26

## 2019-02-14 ENCOUNTER — APPOINTMENT (OUTPATIENT)
Dept: GYNECOLOGIC ONCOLOGY | Facility: CLINIC | Age: 74
End: 2019-02-14
Payer: MEDICARE

## 2019-02-14 VITALS
BODY MASS INDEX: 26.66 KG/M2 | HEIGHT: 65 IN | DIASTOLIC BLOOD PRESSURE: 80 MMHG | SYSTOLIC BLOOD PRESSURE: 160 MMHG | WEIGHT: 160 LBS

## 2019-02-14 DIAGNOSIS — Z80.3 FAMILY HISTORY OF MALIGNANT NEOPLASM OF BREAST: ICD-10-CM

## 2019-02-14 PROCEDURE — 99205 OFFICE O/P NEW HI 60 MIN: CPT

## 2019-02-14 RX ORDER — CARVEDILOL 6.25 MG/1
6.25 TABLET, FILM COATED ORAL TWICE DAILY
Qty: 30 | Refills: 3 | Status: DISCONTINUED | COMMUNITY
Start: 2018-10-19 | End: 2019-02-14

## 2019-02-14 NOTE — PAST MEDICAL HISTORY
[Postmenopausal] : The patient is postmenopausal [Menarche Age ____] : age at menarche was [unfilled] [Menopause Age____] : age at menopause was [unfilled] [Total Preg ___] : G[unfilled] [Live Births ___] : P[unfilled]  [Full Term ___] : Full Term: [unfilled] [Living ___] : Living: [unfilled]

## 2019-02-14 NOTE — OB HISTORY
[Total Preg ___] : : [unfilled] [Full Term ___] : [unfilled] (full-term) [Living ___] : [unfilled] (living) [Vaginal ___] : [unfilled] vaginal delivery(s) [Menarche Age ____] : age at menarche was [unfilled] [Menopause  Age ____] : menopause occurred at age [unfilled]

## 2019-02-15 LAB — CEA SERPL-MCNC: 3 NG/ML

## 2019-02-22 ENCOUNTER — APPOINTMENT (OUTPATIENT)
Dept: CARDIOLOGY | Facility: CLINIC | Age: 74
End: 2019-02-22
Payer: MEDICARE

## 2019-02-22 VITALS
WEIGHT: 160 LBS | OXYGEN SATURATION: 99 % | SYSTOLIC BLOOD PRESSURE: 168 MMHG | DIASTOLIC BLOOD PRESSURE: 72 MMHG | BODY MASS INDEX: 26.66 KG/M2 | HEART RATE: 61 BPM | HEIGHT: 65 IN

## 2019-02-22 LAB — CANCER AG125 SERPL-ACNC: 143 U/ML

## 2019-02-22 PROCEDURE — 99214 OFFICE O/P EST MOD 30 MIN: CPT

## 2019-02-22 RX ORDER — PITAVASTATIN CALCIUM 4.18 MG/1
4 TABLET, FILM COATED ORAL
Refills: 0 | Status: DISCONTINUED | COMMUNITY
End: 2019-02-22

## 2019-02-28 NOTE — DISCUSSION/SUMMARY
[Reviewed Clinical Lab Test(s)] : Results of clinical tests were reviewed. [Reviewed Radiology Report(s)] : Radiology reports were reviewed. [Discuss Tests w/Referring Providers] : Results of labs/radiology studies and the treatment recommendations were discussed with performing/referring physician. [Discuss Alternatives/Risks/Benefits w/Patient] : All alternatives, risks, and benefits were discussed with the patient/family and all questions were answered.  Patient expressed good understanding and appreciates the importance of follow up as recommended.

## 2019-02-28 NOTE — LETTER BODY
[Dear  ___] : Dear  [unfilled], [I had the pleasure of evaluating your patient, [unfilled] for ___] : I had the pleasure of evaluating your patient, [unfilled] for [unfilled]. [Attached please find my note.] : Attached please find my note. [FreeTextEntry2] : She will be scheduled for surgery in the near future and I will keep you updated on her progress. Thank you very much for referring this erlinda patient.\par \par

## 2019-02-28 NOTE — PHYSICAL EXAM
[Normal] : Recto-Vaginal Exam: Normal [de-identified] : LLE scars noted [de-identified] : adnexa nonpalpable

## 2019-02-28 NOTE — REVIEW OF SYSTEMS
[Negative] : Musculoskeletal [Diabetes] : diabetes mellitus [FreeTextEntry5] : Claudication, with PAD [de-identified] : HTN, dyslipidemia

## 2019-03-04 ENCOUNTER — OUTPATIENT (OUTPATIENT)
Dept: OUTPATIENT SERVICES | Facility: HOSPITAL | Age: 74
LOS: 1 days | End: 2019-03-04
Payer: MEDICARE

## 2019-03-04 VITALS
HEIGHT: 61.5 IN | HEART RATE: 64 BPM | WEIGHT: 156.97 LBS | TEMPERATURE: 98 F | DIASTOLIC BLOOD PRESSURE: 62 MMHG | SYSTOLIC BLOOD PRESSURE: 142 MMHG | RESPIRATION RATE: 16 BRPM | OXYGEN SATURATION: 98 %

## 2019-03-04 DIAGNOSIS — E11.9 TYPE 2 DIABETES MELLITUS WITHOUT COMPLICATIONS: ICD-10-CM

## 2019-03-04 DIAGNOSIS — C54.1 MALIGNANT NEOPLASM OF ENDOMETRIUM: ICD-10-CM

## 2019-03-04 DIAGNOSIS — I73.9 PERIPHERAL VASCULAR DISEASE, UNSPECIFIED: ICD-10-CM

## 2019-03-04 DIAGNOSIS — I10 ESSENTIAL (PRIMARY) HYPERTENSION: ICD-10-CM

## 2019-03-04 DIAGNOSIS — N85.00 ENDOMETRIAL HYPERPLASIA, UNSPECIFIED: ICD-10-CM

## 2019-03-04 LAB
ANION GAP SERPL CALC-SCNC: 13 MMO/L — SIGNIFICANT CHANGE UP (ref 7–14)
APPEARANCE UR: CLEAR — SIGNIFICANT CHANGE UP
BACTERIA # UR AUTO: NEGATIVE — SIGNIFICANT CHANGE UP
BILIRUB UR-MCNC: SIGNIFICANT CHANGE UP
BLD GP AB SCN SERPL QL: NEGATIVE — SIGNIFICANT CHANGE UP
BLOOD UR QL VISUAL: NEGATIVE — SIGNIFICANT CHANGE UP
BUN SERPL-MCNC: 20 MG/DL — SIGNIFICANT CHANGE UP (ref 7–23)
CALCIUM SERPL-MCNC: 10 MG/DL — SIGNIFICANT CHANGE UP (ref 8.4–10.5)
CHLORIDE SERPL-SCNC: 102 MMOL/L — SIGNIFICANT CHANGE UP (ref 98–107)
CO2 SERPL-SCNC: 23 MMOL/L — SIGNIFICANT CHANGE UP (ref 22–31)
COLOR SPEC: YELLOW — SIGNIFICANT CHANGE UP
CREAT SERPL-MCNC: 1 MG/DL — SIGNIFICANT CHANGE UP (ref 0.5–1.3)
GLUCOSE SERPL-MCNC: 190 MG/DL — HIGH (ref 70–99)
GLUCOSE UR-MCNC: 200 — HIGH
HBA1C BLD-MCNC: 10.4 % — HIGH (ref 4–5.6)
HYALINE CASTS # UR AUTO: HIGH
KETONES UR-MCNC: NEGATIVE — SIGNIFICANT CHANGE UP
LEUKOCYTE ESTERASE UR-ACNC: SIGNIFICANT CHANGE UP
MUCOUS THREADS # UR AUTO: SIGNIFICANT CHANGE UP
NITRITE UR-MCNC: NEGATIVE — SIGNIFICANT CHANGE UP
PH UR: 6 — SIGNIFICANT CHANGE UP (ref 5–8)
POTASSIUM SERPL-MCNC: 4.2 MMOL/L — SIGNIFICANT CHANGE UP (ref 3.5–5.3)
POTASSIUM SERPL-SCNC: 4.2 MMOL/L — SIGNIFICANT CHANGE UP (ref 3.5–5.3)
PROT UR-MCNC: 70 — SIGNIFICANT CHANGE UP
RBC CASTS # UR COMP ASSIST: SIGNIFICANT CHANGE UP (ref 0–?)
RH IG SCN BLD-IMP: POSITIVE — SIGNIFICANT CHANGE UP
SODIUM SERPL-SCNC: 138 MMOL/L — SIGNIFICANT CHANGE UP (ref 135–145)
SP GR SPEC: 1.02 — SIGNIFICANT CHANGE UP (ref 1–1.04)
SQUAMOUS # UR AUTO: SIGNIFICANT CHANGE UP
UROBILINOGEN FLD QL: SIGNIFICANT CHANGE UP
WBC UR QL: HIGH (ref 0–?)

## 2019-03-04 PROCEDURE — 93010 ELECTROCARDIOGRAM REPORT: CPT

## 2019-03-04 NOTE — H&P PST ADULT - PROBLEM SELECTOR PLAN 3
OR booking notified,   pt instructed to hold Metformin and Glipizide the night before and morning of the surgery

## 2019-03-04 NOTE — H&P PST ADULT - PMH
Diabetes Mellitus Type II  dx 2002   pt does not take blood sugar  Dry eye    Endometrial hyperplasia  curremt  Fracture  right foot pt wearing a boot/ OA  HLD (Hyperlipidemia)    HTN (Hypertension)    Obesity  BMI 29  Peripheral Vascular Disease  lower extremity    PVD s/p fem/ pop bypass b/l legs

## 2019-03-04 NOTE — H&P PST ADULT - HISTORY OF PRESENT ILLNESS
73 year old female with hx of endometrial thickening on ultrasound in 01/2019, s/p D&C, hysteroscopy on 1-30-19, diagnosed with endometrial cancer, denies abdominal, pelvic pain, abnormal vaginal bleeding, weight loss or fatigue, presents to [facility] for evaluation for Robotic Total Laparoscopic Hysterectomy, Bilateral Salpingo Oophorectomy, Staging, Possible Exploratory Laparotomy and Cystoscopy on 03/15/19

## 2019-03-04 NOTE — H&P PST ADULT - CARDIOVASCULAR COMMENTS
hx of PAD, s/p left fem-pop bypass in 2011, bilateral common iliac artery stenting 2016 on plavix and aspirin

## 2019-03-04 NOTE — H&P PST ADULT - NEGATIVE ENMT SYMPTOMS
no hearing difficulty/no ear pain/no sinus symptoms/no nasal congestion/no post-nasal discharge/no nose bleeds/no throat pain/no dysphagia no post-nasal discharge/no nose bleeds/no ear pain/no sinus symptoms/no throat pain/no dysphagia/no nasal congestion

## 2019-03-04 NOTE — H&P PST ADULT - PROBLEM SELECTOR PLAN 4
h/o b/l common iliac artery stenting, on plavix and aspirin,  reports instructed to hold Plavix 5 days preop as per cardiologist, continue aspirin  s/p cardiac eval as, copy in chart h/o b/l common iliac artery stenting, on plavix and aspirin,  reports instructed to hold Plavix 5 days preop as per cardiologist, continue aspirin  s/p cardiac eval as, copy in chart, nuclear stress test report in chart

## 2019-03-04 NOTE — H&P PST ADULT - PROBLEM SELECTOR PLAN 1
Pt scheduled for Robotic Total Laparoscopic Hysterectomy, Bilateral Salpingo Oophorectomy, Staging, Possible Exploratory Laparotomy and Cystoscopy on 03/15/19  Preop instructions provided. Pt verbalized understanding.   Pepcid for GI prophylaxis provided   Chlorhexidine wash with instructions provided.

## 2019-03-04 NOTE — H&P PST ADULT - FAMILY HISTORY
Mother  Still living? No  Family history of dementia, Age at diagnosis: Age Unknown     Sibling  Still living? Yes, Estimated age: Age Unknown  Family history of breast cancer, Age at diagnosis: Age Unknown

## 2019-03-04 NOTE — H&P PST ADULT - MUSCULOSKELETAL
details… detailed exam no joint warmth/no calf tenderness/ROM intact/normal strength/no joint erythema/no joint swelling

## 2019-03-04 NOTE — H&P PST ADULT - ABILITY TO HEAR (WITH HEARING AID OR HEARING APPLIANCE IF NORMALLY USED):
Adequate: hears normal conversation without difficulty Mildly to Moderately Impaired: difficulty hearing in some environments or speaker may need to increase volume or speak distinctly/bilateral hearing difficulty, does not use hearing aid

## 2019-03-04 NOTE — H&P PST ADULT - NEGATIVE GENERAL GENITOURINARY SYMPTOMS
no hematuria/no dysuria/no flank pain R/no bladder infections/no flank pain L/no renal colic/no incontinence

## 2019-03-04 NOTE — H&P PST ADULT - PROBLEM SELECTOR PLAN 2
pt instructed to take amlodipine, carvedilol, clonidine on the morning of the surgery with a sip of water

## 2019-03-06 LAB
BACTERIA UR CULT: SIGNIFICANT CHANGE UP
SPECIMEN SOURCE: SIGNIFICANT CHANGE UP

## 2019-03-12 NOTE — REASON FOR VISIT
[Follow-Up - Clinic] : a clinic follow-up of [FreeTextEntry2] : PAD [FreeTextEntry1] : 72 y/o f with h/o Tob. abuse, HTN, HLD, DM, PAD with multiple PTA, s/p left Fem - Pop bypass ~ 2011   who originally presented with life style limiting left leg claudication\par \par  In March/APpeil 2016 patient underwent LE angiogram which showed occluded LEFT fem-pop graft.  She had bilateral common iliac artery stenting (still patent) and left sfa/popliteal artery stenting (occluded on 2018 CT and arterial duplex)\par \par She followed up in Oct 2018 and noted to have on arterial duplex severe distal R SFA stenosis, L severe stenosis profunda and stented but occluded L SFA and L popliteal arteries.  CTA abd/pelvis also showed patent common iliac artery stents.  \par \par Recently found to have cancer of endometrium.  Plan for total hysterectomy in March 15th at [facility].  Seeking second opinion regarding surgery\par \par Pt has not been walking due to broken left first toe.  Pain slightly improved.  No calf/thigh pain at rest or with exertion.  \par \par \par

## 2019-03-12 NOTE — DISCUSSION/SUMMARY
[FreeTextEntry1] : 71 yo F with significant PAD s/p multiple prior interventions (s/p L fem-pop bypass), b/l iliac stenting,  presents with worsening claudication\par \par # endometrial cancer\par - plan for total hysterectomy at [facility] March 15th.  Patient getting second opinion before surgery\par Going to [facility] \par [address], [place] \par [phone] \par NS obstretics and Gyn--Zeeshan. \par \par # PAD\par - no CLI, cannot assess claudication given broken toe, very limited mobility\par MARIANGEL/PVR - \par Arterial Duplex - severe distal R SFA stenosis (PSV 374cm/s), L severe stenosis profunda and stented but occluded L SFA and L popliteal arteries.\par Renal Duplex - mod stenosis bilateral renal arteries.  PSV Right prox 290 cm/s; Left prox 180 cm/s\par Carotid Stenosis - moderate 50-69% stenosis  RODERICK; severe > 70% stenosis LICA \par ECHO - pending\par Nuc Stress - small/mod reversible defect in basal inferior and basal inferoseptal walls\par CTA abd/pel with runoff - no significant renal artery stenosis \par \par # HTN - much improved though still in 160s systolic\par \par Plan:\par - newly diagnosed endometrial ca, likely plan for total hysterectomy.  Pt getting 2nd opinion\par - cont asa/plavix\par - cont crestor 20mg \par - cont coreg 6.25mb BID, clonidine 0.2mg BID, hctz to 25mg and norvasc 10mg\par - will ultimately need further imaging of carotids, asymptomatic, no urgent need for revascularization\par - HbA1c 10.5% improved with good diet and medication compliance (repeat labs from outside to be faxed over, including lipid profile)\par \par Addendum :\par May hold Plavix 5 days prior to GYN surgery, restart post as per GYN recommendations.    \par Currently asymptomatic, denies CP or SOB, able to achieve 4METs, minimal ever-infarct ischemia by stress test. No further cardiac testing needed prior to hysterectomy.

## 2019-03-14 ENCOUNTER — TRANSCRIPTION ENCOUNTER (OUTPATIENT)
Age: 74
End: 2019-03-14

## 2019-03-14 NOTE — ASU PATIENT PROFILE, ADULT - ABILITY TO HEAR (WITH HEARING AID OR HEARING APPLIANCE IF NORMALLY USED):
Mildly to Moderately Impaired: difficulty hearing in some environments or speaker may need to increase volume or speak distinctly/bilateral hearing difficulty, does not use hearing aid

## 2019-03-15 ENCOUNTER — RESULT REVIEW (OUTPATIENT)
Age: 74
End: 2019-03-15

## 2019-03-15 ENCOUNTER — APPOINTMENT (OUTPATIENT)
Dept: GYNECOLOGIC ONCOLOGY | Facility: HOSPITAL | Age: 74
End: 2019-03-15

## 2019-03-15 ENCOUNTER — INPATIENT (INPATIENT)
Facility: HOSPITAL | Age: 74
LOS: 0 days | Discharge: HOME | End: 2019-03-16
Attending: OBSTETRICS & GYNECOLOGY | Admitting: OBSTETRICS & GYNECOLOGY
Payer: MEDICARE

## 2019-03-15 VITALS
HEART RATE: 76 BPM | HEIGHT: 61.5 IN | DIASTOLIC BLOOD PRESSURE: 55 MMHG | OXYGEN SATURATION: 96 % | WEIGHT: 156.97 LBS | SYSTOLIC BLOOD PRESSURE: 157 MMHG | RESPIRATION RATE: 16 BRPM | TEMPERATURE: 98 F

## 2019-03-15 DIAGNOSIS — C54.1 MALIGNANT NEOPLASM OF ENDOMETRIUM: ICD-10-CM

## 2019-03-15 LAB
BASE EXCESS BLDA CALC-SCNC: -1.9 MMOL/L — SIGNIFICANT CHANGE UP
CA-I BLDA-SCNC: 1.19 MMOL/L — SIGNIFICANT CHANGE UP (ref 1.15–1.29)
GLUCOSE BLDA-MCNC: 238 MG/DL — HIGH (ref 70–99)
GLUCOSE BLDC GLUCOMTR-MCNC: 176 MG/DL — HIGH (ref 70–99)
GLUCOSE BLDC GLUCOMTR-MCNC: 231 MG/DL — HIGH (ref 70–99)
HCO3 BLDA-SCNC: 23 MMOL/L — SIGNIFICANT CHANGE UP (ref 22–26)
HCT VFR BLDA CALC: 33.3 % — LOW (ref 34.5–46.5)
HGB BLDA-MCNC: 10.8 G/DL — LOW (ref 11.5–15.5)
PCO2 BLDA: 38 MMHG — SIGNIFICANT CHANGE UP (ref 32–48)
PH BLDA: 7.39 PH — SIGNIFICANT CHANGE UP (ref 7.35–7.45)
PO2 BLDA: 342 MMHG — HIGH (ref 83–108)
POTASSIUM BLDA-SCNC: 3.8 MMOL/L — SIGNIFICANT CHANGE UP (ref 3.4–4.5)
RH IG SCN BLD-IMP: POSITIVE — SIGNIFICANT CHANGE UP
SAO2 % BLDA: 99.5 % — HIGH (ref 95–99)
SODIUM BLDA-SCNC: 139 MMOL/L — SIGNIFICANT CHANGE UP (ref 136–146)

## 2019-03-15 PROCEDURE — 88331 PATH CONSLTJ SURG 1 BLK 1SPC: CPT | Mod: 26

## 2019-03-15 PROCEDURE — 88341 IMHCHEM/IMCYTCHM EA ADD ANTB: CPT | Mod: 26,59

## 2019-03-15 PROCEDURE — 88307 TISSUE EXAM BY PATHOLOGIST: CPT | Mod: 26

## 2019-03-15 PROCEDURE — 88342 IMHCHEM/IMCYTCHM 1ST ANTB: CPT | Mod: 26

## 2019-03-15 PROCEDURE — 88305 TISSUE EXAM BY PATHOLOGIST: CPT | Mod: 26

## 2019-03-15 PROCEDURE — 88112 CYTOPATH CELL ENHANCE TECH: CPT | Mod: 26

## 2019-03-15 RX ORDER — HEPARIN SODIUM 5000 [USP'U]/ML
5000 INJECTION INTRAVENOUS; SUBCUTANEOUS EVERY 8 HOURS
Qty: 0 | Refills: 0 | Status: DISCONTINUED | OUTPATIENT
Start: 2019-03-15 | End: 2019-03-16

## 2019-03-15 RX ORDER — SODIUM CHLORIDE 9 MG/ML
1000 INJECTION, SOLUTION INTRAVENOUS
Qty: 0 | Refills: 0 | Status: DISCONTINUED | OUTPATIENT
Start: 2019-03-15 | End: 2019-03-16

## 2019-03-15 RX ORDER — OXYCODONE HYDROCHLORIDE 5 MG/1
5 TABLET ORAL EVERY 4 HOURS
Qty: 0 | Refills: 0 | Status: DISCONTINUED | OUTPATIENT
Start: 2019-03-15 | End: 2019-03-16

## 2019-03-15 RX ORDER — DEXTROSE 50 % IN WATER 50 %
25 SYRINGE (ML) INTRAVENOUS ONCE
Qty: 0 | Refills: 0 | Status: DISCONTINUED | OUTPATIENT
Start: 2019-03-15 | End: 2019-03-16

## 2019-03-15 RX ORDER — INSULIN LISPRO 100/ML
VIAL (ML) SUBCUTANEOUS
Qty: 0 | Refills: 0 | Status: DISCONTINUED | OUTPATIENT
Start: 2019-03-15 | End: 2019-03-16

## 2019-03-15 RX ORDER — ACETAMINOPHEN 500 MG
650 TABLET ORAL EVERY 6 HOURS
Qty: 0 | Refills: 0 | Status: DISCONTINUED | OUTPATIENT
Start: 2019-03-15 | End: 2019-03-16

## 2019-03-15 RX ORDER — METOCLOPRAMIDE HCL 10 MG
10 TABLET ORAL ONCE
Qty: 0 | Refills: 0 | Status: DISCONTINUED | OUTPATIENT
Start: 2019-03-15 | End: 2019-03-16

## 2019-03-15 RX ORDER — GLUCAGON INJECTION, SOLUTION 0.5 MG/.1ML
1 INJECTION, SOLUTION SUBCUTANEOUS ONCE
Qty: 0 | Refills: 0 | Status: DISCONTINUED | OUTPATIENT
Start: 2019-03-15 | End: 2019-03-16

## 2019-03-15 RX ORDER — ONDANSETRON 8 MG/1
4 TABLET, FILM COATED ORAL ONCE
Qty: 0 | Refills: 0 | Status: DISCONTINUED | OUTPATIENT
Start: 2019-03-15 | End: 2019-03-16

## 2019-03-15 RX ORDER — HYDROMORPHONE HYDROCHLORIDE 2 MG/ML
1 INJECTION INTRAMUSCULAR; INTRAVENOUS; SUBCUTANEOUS
Qty: 0 | Refills: 0 | Status: DISCONTINUED | OUTPATIENT
Start: 2019-03-15 | End: 2019-03-16

## 2019-03-15 RX ORDER — DEXTROSE 50 % IN WATER 50 %
15 SYRINGE (ML) INTRAVENOUS ONCE
Qty: 0 | Refills: 0 | Status: DISCONTINUED | OUTPATIENT
Start: 2019-03-15 | End: 2019-03-16

## 2019-03-15 RX ORDER — METOPROLOL TARTRATE 50 MG
5 TABLET ORAL EVERY 6 HOURS
Qty: 0 | Refills: 0 | Status: DISCONTINUED | OUTPATIENT
Start: 2019-03-15 | End: 2019-03-16

## 2019-03-15 RX ORDER — OXYCODONE HYDROCHLORIDE 5 MG/1
10 TABLET ORAL EVERY 6 HOURS
Qty: 0 | Refills: 0 | Status: DISCONTINUED | OUTPATIENT
Start: 2019-03-15 | End: 2019-03-16

## 2019-03-15 RX ORDER — DEXTROSE 50 % IN WATER 50 %
12.5 SYRINGE (ML) INTRAVENOUS ONCE
Qty: 0 | Refills: 0 | Status: DISCONTINUED | OUTPATIENT
Start: 2019-03-15 | End: 2019-03-16

## 2019-03-15 RX ORDER — ACETAMINOPHEN 500 MG
325 TABLET ORAL EVERY 4 HOURS
Qty: 0 | Refills: 0 | Status: DISCONTINUED | OUTPATIENT
Start: 2019-03-15 | End: 2019-03-15

## 2019-03-15 RX ORDER — HYDROMORPHONE HYDROCHLORIDE 2 MG/ML
0.5 INJECTION INTRAMUSCULAR; INTRAVENOUS; SUBCUTANEOUS
Qty: 0 | Refills: 0 | Status: DISCONTINUED | OUTPATIENT
Start: 2019-03-15 | End: 2019-03-16

## 2019-03-15 RX ADMIN — SODIUM CHLORIDE 125 MILLILITER(S): 9 INJECTION, SOLUTION INTRAVENOUS at 23:05

## 2019-03-15 NOTE — BRIEF OPERATIVE NOTE - ASSISTANT(S)
Dr Christa Gabriel (fellow), Dr. April Luna, Dr Melani Vaz, Dr Kelsey Ortiz Dr JOVANNY Gabriel (fellow), Dr. April Luna, Dr Melani Vaz, Dr Kelsey Ortiz

## 2019-03-15 NOTE — BRIEF OPERATIVE NOTE - NSICDXBRIEFPROCEDURE_GEN_ALL_CORE_FT
PROCEDURES:  Cystoscopy, female 15-Mar-2019 21:53:27  Emy Huertas  Robot-assisted laparoscopic omentectomy 15-Mar-2019 21:53:16  Emy Huertas  Robot-assisted laparoscopic pelvic lymph node dissection 15-Mar-2019 21:53:04  Emy Huertas  Robot-assisted total abdominal hysterectomy with bilateral salpingo-oophorectomy 15-Mar-2019 21:52:32  Emy Huertas

## 2019-03-15 NOTE — ASU PREOP CHECKLIST - MD NOTIFIED
last dose taken 10 days ago/MD Notified (Please specify) MD Notified (Please specify)/last dose taken 10 days ago, Dr Vaz made aware

## 2019-03-15 NOTE — BRIEF OPERATIVE NOTE - OPERATION/FINDINGS
6 week size anteverted uterus, normal appearing adnexa.  No carcinomatosis or ascites, normal upper abdominal survey.

## 2019-03-15 NOTE — PROVIDER CONTACT NOTE (OTHER) - ASSESSMENT
No s/s of hyperglycemia noted, need to notify as per Hysterectomy sheet. No s/s of hypertension present, no s/s of bleeding noted.

## 2019-03-15 NOTE — ASU PREOP CHECKLIST - 3.
Pt hyperglycemic, has not taken beta blocker in 10 days and also has not taken aspirin in x2 days (pt has femoral stent). Dr Vaz made aware, see provider contact for details & interventions.

## 2019-03-15 NOTE — BRIEF OPERATIVE NOTE - SPECIMENS
Uterus, cervix, bilateral fallopian tubes, sentinal lymph nodes, bilateral pelvic lymph nodes, omentum Uterus, cervix, bilateral fallopian tubes and ovaries, sentinal lymph nodes, bilateral pelvic lymph nodes, omentum, peritoneal nodules

## 2019-03-15 NOTE — ASU PREOP CHECKLIST - 2.
Pt Croatian speaking,  utilized to verify  Giovanny may translate, see flow sheet for  details Pt Occitan speaking,  utilized to verify  Giovanny & Son Navdeep may translate, see flow sheet for  details

## 2019-03-16 ENCOUNTER — TRANSCRIPTION ENCOUNTER (OUTPATIENT)
Age: 74
End: 2019-03-16

## 2019-03-16 VITALS
DIASTOLIC BLOOD PRESSURE: 53 MMHG | SYSTOLIC BLOOD PRESSURE: 156 MMHG | OXYGEN SATURATION: 95 % | HEART RATE: 91 BPM | TEMPERATURE: 99 F | RESPIRATION RATE: 18 BRPM

## 2019-03-16 DIAGNOSIS — C55 MALIGNANT NEOPLASM OF UTERUS, PART UNSPECIFIED: ICD-10-CM

## 2019-03-16 LAB
ANION GAP SERPL CALC-SCNC: 17 MMO/L — HIGH (ref 7–14)
BUN SERPL-MCNC: 14 MG/DL — SIGNIFICANT CHANGE UP (ref 7–23)
CALCIUM SERPL-MCNC: 9.5 MG/DL — SIGNIFICANT CHANGE UP (ref 8.4–10.5)
CHLORIDE SERPL-SCNC: 100 MMOL/L — SIGNIFICANT CHANGE UP (ref 98–107)
CO2 SERPL-SCNC: 20 MMOL/L — LOW (ref 22–31)
CREAT SERPL-MCNC: 0.69 MG/DL — SIGNIFICANT CHANGE UP (ref 0.5–1.3)
GLUCOSE BLDC GLUCOMTR-MCNC: 290 MG/DL — HIGH (ref 70–99)
GLUCOSE BLDC GLUCOMTR-MCNC: 319 MG/DL — HIGH (ref 70–99)
GLUCOSE BLDC GLUCOMTR-MCNC: 330 MG/DL — HIGH (ref 70–99)
GLUCOSE BLDC GLUCOMTR-MCNC: 371 MG/DL — HIGH (ref 70–99)
GLUCOSE SERPL-MCNC: 338 MG/DL — HIGH (ref 70–99)
HCT VFR BLD CALC: 35.6 % — SIGNIFICANT CHANGE UP (ref 34.5–45)
HGB BLD-MCNC: 11.5 G/DL — SIGNIFICANT CHANGE UP (ref 11.5–15.5)
MAGNESIUM SERPL-MCNC: 1.8 MG/DL — SIGNIFICANT CHANGE UP (ref 1.6–2.6)
MCHC RBC-ENTMCNC: 28.3 PG — SIGNIFICANT CHANGE UP (ref 27–34)
MCHC RBC-ENTMCNC: 32.3 % — SIGNIFICANT CHANGE UP (ref 32–36)
MCV RBC AUTO: 87.5 FL — SIGNIFICANT CHANGE UP (ref 80–100)
NRBC # FLD: 0 K/UL — LOW (ref 25–125)
PHOSPHATE SERPL-MCNC: 3.7 MG/DL — SIGNIFICANT CHANGE UP (ref 2.5–4.5)
PLATELET # BLD AUTO: 299 K/UL — SIGNIFICANT CHANGE UP (ref 150–400)
PMV BLD: 9.6 FL — SIGNIFICANT CHANGE UP (ref 7–13)
POTASSIUM SERPL-MCNC: 4 MMOL/L — SIGNIFICANT CHANGE UP (ref 3.5–5.3)
POTASSIUM SERPL-SCNC: 4 MMOL/L — SIGNIFICANT CHANGE UP (ref 3.5–5.3)
RBC # BLD: 4.07 M/UL — SIGNIFICANT CHANGE UP (ref 3.8–5.2)
RBC # FLD: 13.2 % — SIGNIFICANT CHANGE UP (ref 10.3–14.5)
SODIUM SERPL-SCNC: 137 MMOL/L — SIGNIFICANT CHANGE UP (ref 135–145)
WBC # BLD: 10.26 K/UL — SIGNIFICANT CHANGE UP (ref 3.8–10.5)
WBC # FLD AUTO: 10.26 K/UL — SIGNIFICANT CHANGE UP (ref 3.8–10.5)

## 2019-03-16 RX ORDER — AMLODIPINE BESYLATE 2.5 MG/1
10 TABLET ORAL DAILY
Qty: 0 | Refills: 0 | Status: DISCONTINUED | OUTPATIENT
Start: 2019-03-16 | End: 2019-03-16

## 2019-03-16 RX ORDER — OXYCODONE HYDROCHLORIDE 5 MG/1
1 TABLET ORAL
Qty: 5 | Refills: 0
Start: 2019-03-16

## 2019-03-16 RX ORDER — ACETAMINOPHEN 500 MG
2 TABLET ORAL
Qty: 0 | Refills: 0 | DISCHARGE
Start: 2019-03-16

## 2019-03-16 RX ORDER — INFLUENZA VIRUS VACCINE 15; 15; 15; 15 UG/.5ML; UG/.5ML; UG/.5ML; UG/.5ML
0.5 SUSPENSION INTRAMUSCULAR ONCE
Qty: 0 | Refills: 0 | Status: DISCONTINUED | OUTPATIENT
Start: 2019-03-16 | End: 2019-03-16

## 2019-03-16 RX ORDER — HYDROCHLOROTHIAZIDE 25 MG
25 TABLET ORAL DAILY
Qty: 0 | Refills: 0 | Status: DISCONTINUED | OUTPATIENT
Start: 2019-03-16 | End: 2019-03-16

## 2019-03-16 RX ADMIN — Medication 5: at 09:27

## 2019-03-16 RX ADMIN — SODIUM CHLORIDE 125 MILLILITER(S): 9 INJECTION, SOLUTION INTRAVENOUS at 05:28

## 2019-03-16 RX ADMIN — HEPARIN SODIUM 5000 UNIT(S): 5000 INJECTION INTRAVENOUS; SUBCUTANEOUS at 05:25

## 2019-03-16 RX ADMIN — Medication 4: at 12:07

## 2019-03-16 RX ADMIN — Medication 3: at 14:16

## 2019-03-16 RX ADMIN — Medication 5 MILLIGRAM(S): at 12:08

## 2019-03-16 RX ADMIN — Medication 5 MILLIGRAM(S): at 05:26

## 2019-03-16 RX ADMIN — Medication 5 MILLIGRAM(S): at 00:19

## 2019-03-16 NOTE — CHART NOTE - NSCHARTNOTEFT_GEN_A_CORE
Subjective  Patient seen and examined at bedside. No acute complaints at this time. States pain is well controlled with tylenol. Tolerating water and jello without N/V. She has not yet ambulated and shi in situ. Denies CP, SOB, lightheadedness, dizziness, fevers, and chills.    Vital Signs Last 24 Hours  T(C): 36.9 (03-16-19 @ 00:00), Max: 37.2 (03-15-19 @ 22:30)  HR: 73 (03-16-19 @ 01:00) (70 - 87)  BP: 152/57 (03-16-19 @ 01:00) (126/51 - 157/55)  RR: 14 (03-16-19 @ 01:00) (13 - 18)  SpO2: 96% (03-16-19 @ 01:00) (94% - 99%)    I&O's Summary    15 Mar 2019 07:01  -  16 Mar 2019 02:22  --------------------------------------------------------  IN: 425 mL / OUT: 600 mL / NET: -175 mL        Physical Exam:  General: NAD  CV: NR, RR, S1, S2, no M/R/G  Lungs: CTA-B, symmetrical expansion  Abdomen: Soft, appropriately-tender around incision, non-distended, +BS  Incision: 4 5mm port incisions, CDI, dressing in place  Ext: No pain or swelling    Labs:      MEDICATIONS  (STANDING):  dextrose 5%. 1000 milliLiter(s) (50 mL/Hr) IV Continuous <Continuous>  dextrose 50% Injectable 12.5 Gram(s) IV Push once  dextrose 50% Injectable 25 Gram(s) IV Push once  dextrose 50% Injectable 25 Gram(s) IV Push once  heparin  Injectable 5000 Unit(s) SubCutaneous every 8 hours  insulin lispro (HumaLOG) corrective regimen sliding scale   SubCutaneous three times a day before meals  lactated ringers. 1000 milliLiter(s) (30 mL/Hr) IV Continuous <Continuous>  lactated ringers. 1000 milliLiter(s) (125 mL/Hr) IV Continuous <Continuous>  metoprolol tartrate Injectable 5 milliGRAM(s) IV Push every 6 hours    MEDICATIONS  (PRN):  acetaminophen   Tablet .. 650 milliGRAM(s) Oral every 6 hours PRN Mild Pain (1 - 3)  dextrose 40% Gel 15 Gram(s) Oral once PRN Blood Glucose LESS THAN 70 milliGRAM(s)/deciliter  glucagon  Injectable 1 milliGRAM(s) IntraMuscular once PRN Glucose LESS THAN 70 milligrams/deciliter  HYDROmorphone  Injectable 0.5 milliGRAM(s) IV Push every 10 minutes PRN Moderate Pain (4 - 6)  HYDROmorphone  Injectable 1 milliGRAM(s) IV Push every 10 minutes PRN Severe Pain (7 - 10)  metoclopramide Injectable 10 milliGRAM(s) IV Push once PRN Nausea and/or Vomiting  ondansetron Injectable 4 milliGRAM(s) IV Push once PRN Nausea and/or Vomiting  oxyCODONE    IR 5 milliGRAM(s) Oral every 4 hours PRN Moderate Pain (4 - 6)  oxyCODONE    IR 10 milliGRAM(s) Oral every 6 hours PRN Severe Pain (7 - 10)      Assessment and Plan  72 yo female POD#1 from RA-TLH, BSO, PLND, 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   - Neuro: tylenol and oxycodone for pain control  - CV: hemodynamically stable. Lopressor 5mg PRN for hypertension  - Resp: saturating well on RA   - GI: clears  - : shi in situ, strict I/O  - Endo: ISS  - Heme: HSQ and SCDs for DVT ppx  - Dispo: routine postoperative care    ETHAN Gómez pgy2

## 2019-03-16 NOTE — DISCHARGE NOTE NURSING/CASE MANAGEMENT/SOCIAL WORK - NSDCPNDISPN_GEN_ALL_CORE
Education provided on the pain management plan of care/Safe use, storage and disposal of opioids when prescribed/Activities of daily living, including home environment that might     exacerbate pain or reduce effectiveness of the pain management plan of care as well as strategies to address these issues/Side effects of pain management treatment

## 2019-03-16 NOTE — DISCHARGE NOTE PROVIDER - HOSPITAL COURSE
Patient had uncomplicated RA TLH, Bilateral Salpingo-oophorectomy, LND . Please see operative note for details.  During postoperative course patient's vitals were stable, vaginal bleeding appropriate, and pain well controlled.  Post operation day one hematocrit was 35.6.  On day of discharge patient was ambulating, her pain controlled with oral medications, had adequate oral intake, and was voiding freely.  Discharge instructions and precautions were given.  Will have close follow up with Dr. Mcdowell

## 2019-03-16 NOTE — PROGRESS NOTE ADULT - SUBJECTIVE AND OBJECTIVE BOX
GYN ONC PROGRESS NOTE  POD# 1  HD#2    Patient seen and examined at bedside, no acute overnight events. Family at bedside. No acute complaints, pain well controlled.  Patient is ambulating, passing flatus, voiding spontaneously, and tolerating clears. Denies CP, SOB, N/V, fevers, and chills.    Vital Signs Last 24 Hours  T(C): 37.2 (03-16-19 @ 05:24), Max: 37.2 (03-15-19 @ 22:30)  HR: 85 (03-16-19 @ 05:24) (70 - 87)  BP: 141/53 (03-16-19 @ 05:24) (126/51 - 157/55)  RR: 16 (03-16-19 @ 05:24) (13 - 18)  SpO2: 95% (03-16-19 @ 05:24) (94% - 99%)    I&O's Summary    15 Mar 2019 07:01  -  16 Mar 2019 07:00  --------------------------------------------------------  IN: 550 mL / OUT: 1800 mL / NET: -1250 mL        Physical Exam:  General: NAD  CV: NR, RR, S1, S2, no M/R/G  Lungs: CTA-B  Abdomen: Soft, appropriately tender, non-distended, +BS  Incision: port site incisions CDI  Ext: No pain or swelling    Labs:                Blood Type: B Positive      MEDICATIONS  (STANDING):  dextrose 5%. 1000 milliLiter(s) (50 mL/Hr) IV Continuous <Continuous>  dextrose 50% Injectable 12.5 Gram(s) IV Push once  dextrose 50% Injectable 25 Gram(s) IV Push once  dextrose 50% Injectable 25 Gram(s) IV Push once  heparin  Injectable 5000 Unit(s) SubCutaneous every 8 hours  insulin lispro (HumaLOG) corrective regimen sliding scale   SubCutaneous three times a day before meals  metoprolol tartrate Injectable 5 milliGRAM(s) IV Push every 6 hours    MEDICATIONS  (PRN):  acetaminophen   Tablet .. 650 milliGRAM(s) Oral every 6 hours PRN Mild Pain (1 - 3)  dextrose 40% Gel 15 Gram(s) Oral once PRN Blood Glucose LESS THAN 70 milliGRAM(s)/deciliter  glucagon  Injectable 1 milliGRAM(s) IntraMuscular once PRN Glucose LESS THAN 70 milligrams/deciliter  oxyCODONE    IR 5 milliGRAM(s) Oral every 4 hours PRN Moderate Pain (4 - 6)  oxyCODONE    IR 10 milliGRAM(s) Oral every 6 hours PRN Severe Pain (7 - 10)

## 2019-03-16 NOTE — DISCHARGE NOTE PROVIDER - NSDCCPGOAL_GEN_ALL_CORE_FT
To get better and follow your care plan as instructed.  Regular diet as tolerated, regular activity as tolerated, no heavy lifting for first two weeks.  Nothing per vagina: no intercourse, tampons or douching.  Call your provider if you experience fevers, chills, worsening abdominal pain, inability to urinate or worsening vaginal bleeding.  Follow up with your provider

## 2019-03-16 NOTE — DISCHARGE NOTE PROVIDER - CARE PROVIDERS DIRECT ADDRESSES
[email or web address]

## 2019-03-16 NOTE — DISCHARGE NOTE NURSING/CASE MANAGEMENT/SOCIAL WORK - NSDCDPATPORTLINK_GEN_ALL_CORE
You can access the [facility] Portal, offered by [facility], by registering with the following website: [email or web address]

## 2019-03-16 NOTE — PROGRESS NOTE ADULT - ASSESSMENT
Pt is a 74 yo POD#1 s/p RA TLH, Bilateral Salpingo-oophorectomy, staging, LND, and cysto. Pt is HD stable. Pain is well controlled. Tolerated clears.     Neuro: c/w po pain meds  CV: VSS, CBC in AM. Hx of HTN. Will resume home BP meds. BPS 140s/50s overnight.  Pulm: advance to reg diet  : UOP adequate,   Heme: HSQ and SCDs for DVT ppx.   Dispo: Continue routine post-op care. Anticipate d/c later today     Gustavo, pgy2

## 2019-03-18 ENCOUNTER — INBOUND DOCUMENT (OUTPATIENT)
Age: 74
End: 2019-03-18

## 2019-03-18 LAB — NON-GYNECOLOGICAL CYTOLOGY STUDY: SIGNIFICANT CHANGE UP

## 2019-03-28 ENCOUNTER — APPOINTMENT (OUTPATIENT)
Dept: GYNECOLOGIC ONCOLOGY | Facility: CLINIC | Age: 74
End: 2019-03-28
Payer: MEDICARE

## 2019-03-28 VITALS — DIASTOLIC BLOOD PRESSURE: 70 MMHG | SYSTOLIC BLOOD PRESSURE: 179 MMHG | HEART RATE: 86 BPM

## 2019-03-28 DIAGNOSIS — R93.89 ABNORMAL FINDINGS ON DIAGNOSTIC IMAGING OF OTHER SPECIFIED BODY STRUCTURES: ICD-10-CM

## 2019-03-28 LAB — SURGICAL PATHOLOGY STUDY: SIGNIFICANT CHANGE UP

## 2019-03-28 PROCEDURE — 99024 POSTOP FOLLOW-UP VISIT: CPT

## 2019-04-09 ENCOUNTER — OUTPATIENT (OUTPATIENT)
Dept: OUTPATIENT SERVICES | Facility: HOSPITAL | Age: 74
LOS: 1 days | Discharge: HOME | End: 2019-04-09

## 2019-04-09 DIAGNOSIS — C54.1 MALIGNANT NEOPLASM OF ENDOMETRIUM: ICD-10-CM

## 2019-04-13 ENCOUNTER — CLINICAL ADVICE (OUTPATIENT)
Age: 74
End: 2019-04-13

## 2019-04-15 ENCOUNTER — APPOINTMENT (OUTPATIENT)
Dept: HEMATOLOGY ONCOLOGY | Facility: CLINIC | Age: 74
End: 2019-04-15
Payer: MEDICARE

## 2019-04-15 VITALS
RESPIRATION RATE: 16 BRPM | WEIGHT: 158.07 LBS | SYSTOLIC BLOOD PRESSURE: 197 MMHG | BODY MASS INDEX: 28.72 KG/M2 | TEMPERATURE: 98.1 F | HEART RATE: 75 BPM | HEIGHT: 62.2 IN | DIASTOLIC BLOOD PRESSURE: 64 MMHG

## 2019-04-15 PROCEDURE — 99205 OFFICE O/P NEW HI 60 MIN: CPT

## 2019-04-19 NOTE — REASON FOR VISIT
[Initial Consultation] : an initial consultation [Spouse] : spouse [Family Member] : family member [FreeTextEntry2] : evaluation for uterine cancer: serous Stage I

## 2019-04-19 NOTE — PHYSICAL EXAM
[Restricted in physically strenuous activity but ambulatory and able to carry out work of a light or sedentary nature] : Status 1- Restricted in physically strenuous activity but ambulatory and able to carry out work of a light or sedentary nature, e.g., light house work, office work [Obese] : obese [Normal] : affect appropriate [de-identified] : laparoscopic scars well healed  [de-identified] : gait steady, proprioception intact; strength BLE 5/5

## 2019-04-19 NOTE — REVIEW OF SYSTEMS
[Joint Stiffness] : joint stiffness [Negative] : Allergic/Immunologic [Joint Pain] : no joint pain [Muscle Pain] : no muscle pain [Muscle Weakness] : no muscle weakness [Confused] : no confusion [Dizziness] : no dizziness [Fainting] : no fainting [Difficulty Walking] : no difficulty walking [FreeTextEntry9] : numbness sensation over BLE  [de-identified] : numbness of the fingertips and feet

## 2019-04-19 NOTE — HISTORY OF PRESENT ILLNESS
[Disease: _____________________] : Disease: [unfilled] [T: ___] : T[unfilled] [N: ___] : N[unfilled] [AJCC Stage: ____] : AJCC Stage: [unfilled] [de-identified] : Age 73: Stage IA serous uterine cancer\par She had been having evaluation for PAD with CTA of the abd/ pelvis (mild to moderate stenoses of the R common femoral and superficial femoral arteries and severe stenosis of the distal R superficial femoral artery) and MRI 11/30/18. The MRI showed partially visualized marked abnormal thickening of the endometrium and CTA showing left upper abdominal peritoneal nodules up to 1 cm and indeterminate 1.6 cm adrenal nodule. She underwent endometrial curettage which showed high grade carcinoma with mixed serous and clear cell features involving an endometrial polyp. She had CT of the chest/ abd/ pelvis which again showed stable L adrenal nodule, 6 mm low attenuation left lobe nodule, 1 cm pelvic nodule, and groundglass patchy opacities in the upper lung fields. On 3/15/19, she underwent robotic laparoscopic TLH/ BSO, pelvic and para aortic lymph node sampling and pelvic nodule excision with Dr Mcdowell. The pathology showed serous carcinoma showing superficial invasion of the myometrium, benign pelvic lymph nodes, and fat necrosis with calcification in pelvic nodule. Pelvic wash positive for malignant cells. MSI stable.  [de-identified] : serous carcinoma  [de-identified] : She has been referred by Dr Mcdowell for adjuvant chemotherapy. She has concerns about chemotherapy since she has baseline pain and numbness of the BLE due to PAD and has been on Plavix. She also has diabetes and mild numbness at baseline and diabetes currently not under control. She has recovered well from her surgery and eating normally. She is able to ambulate without assist. No falls or dizziness.  [de-identified] :  on 2/22/19= 143 U/ml

## 2019-04-19 NOTE — ASSESSMENT
[FreeTextEntry1] : She is a 72 y/o F with Stage IA serous uterine carcinoma s/p TLH, BSO, pelvic lymph node sampling and omental nodule excision. We reviewed her pathology and explained the significance of serous uterine cancer. We explained the risk of local and distant recurrence with serous carcinoma and the role of adjuvant chemotherapy with carboplatin/ paclitaxel every 3 weeks for 6 cycles followed by vaginal brachytherapy to decrease the risk of uterine cancer recurrence. We explained surveillance after adjuvant therapy.  We reviewed potential side effects including but not limited to: fatigue, increased risk of infection, low blood counts, numbness and tingling of the hands and feet, allergic reaction, bone pain/ muscle pain, changes in sodium/ potassium/ magnesium levels, nausea/ vomiting, constipation, diarrhea, and hair loss. We reviewed supportive measures to decrease these side effects. We reviewed precautions that would be taken to decrease infection. Written information about the chemotherapy was given to her. We reviewed given her baseline diabetes and PAD causing her symptoms, we would reduce dose of paclitaxel to 135mg/m2. We reviewed reflexology and B supplement to help decrease neuropathy from chemotherapy. She is concerned about side effects and thinking of brachytherapy/ surveillance alone. We reviewed her concerns of chemotherapy and explained that further modifications would be made depending on her tolerance. Questions answered to her and her families' satisfaction. She will speak with her family and primary care physician. She will also consider another oncology opinion. We reviewed if she does not want chemotherapy, she should still consider vaginal brachytherapy. \par \par

## 2019-05-03 ENCOUNTER — APPOINTMENT (OUTPATIENT)
Dept: CARDIOLOGY | Facility: CLINIC | Age: 74
End: 2019-05-03
Payer: MEDICARE

## 2019-05-03 VITALS
BODY MASS INDEX: 28.71 KG/M2 | SYSTOLIC BLOOD PRESSURE: 211 MMHG | DIASTOLIC BLOOD PRESSURE: 70 MMHG | HEART RATE: 83 BPM | WEIGHT: 158 LBS | HEIGHT: 62.2 IN | OXYGEN SATURATION: 83 %

## 2019-05-03 PROCEDURE — 99214 OFFICE O/P EST MOD 30 MIN: CPT

## 2019-05-03 RX ORDER — HYDROCHLOROTHIAZIDE 25 MG/1
25 TABLET ORAL DAILY
Qty: 30 | Refills: 3 | Status: COMPLETED | COMMUNITY
Start: 2018-10-12 | End: 2019-05-03

## 2019-05-03 NOTE — PHYSICAL EXAM
[General Appearance - Well Developed] : well developed [General Appearance - Well Nourished] : well nourished [Normal Oral Mucosa] : normal oral mucosa [Normal Jugular Venous A Waves Present] : normal jugular venous A waves present [No Jugular Venous Olmos A Waves] : no jugular venous olmos A waves [Normal Jugular Venous V Waves Present] : normal jugular venous V waves present [Exaggerated Use Of Accessory Muscles For Inspiration] : no accessory muscle use [Heart Rate And Rhythm] : heart rate and rhythm were normal [Respiration, Rhythm And Depth] : normal respiratory rhythm and effort [Heart Sounds] : normal S1 and S2 [Bowel Sounds] : normal bowel sounds [Abdomen Tenderness] : non-tender [Abdomen Soft] : soft [Abnormal Walk] : normal gait [Nail Clubbing] : no clubbing of the fingernails [Cyanosis, Localized] : no localized cyanosis [Skin Turgor] : normal skin turgor [] : no rash [Oriented To Time, Place, And Person] : oriented to person, place, and time [Impaired Insight] : insight and judgment were intact

## 2019-05-03 NOTE — REASON FOR VISIT
[Follow-Up - Clinic] : a clinic follow-up of [Peripheral Vascular Disease] : peripheral vascular disease [FreeTextEntry1] : 74 y/o f with h/o Tob. abuse, HTN, HLD, DM, PAD with multiple PTA, s/p left Fem - Pop bypass ~ 2011 who originally presented with life style limiting left leg claudication\par \par In March/APpeil 2016 patient underwent LE angiogram which showed occluded LEFT fem-pop graft. She had bilateral common iliac artery stenting (still patent) and left sfa/popliteal artery stenting (occluded on 2018 CT and arterial duplex)\par \par She followed up in Oct 2018 and noted to have on arterial duplex severe distal R SFA stenosis, L severe stenosis profunda and stented but occluded L SFA and L popliteal arteries. CTA abd/pelvis also showed patent common iliac artery stents. \par \par Recently found to have cancer of endometrium and she is s/p hysterectomy. She is due for chemotherapy as well\par \par She continues to complain of bilateral leg claudication with mild exertion as well as paresthesias of the plantar feet bilaterally\par

## 2019-05-03 NOTE — REVIEW OF SYSTEMS
[Feeling Fatigued] : feeling fatigued [Lower Ext Edema] : lower extremity edema [Tingling (Paresthesia)] : tingling [Negative] : Gastrointestinal [Shortness Of Breath] : no shortness of breath [Chest Pain] : no chest pain [Dyspnea on exertion] : not dyspnea during exertion [Palpitations] : no palpitations

## 2019-05-10 ENCOUNTER — APPOINTMENT (OUTPATIENT)
Dept: CARDIOLOGY | Facility: CLINIC | Age: 74
End: 2019-05-10

## 2019-05-13 ENCOUNTER — OUTPATIENT (OUTPATIENT)
Dept: OUTPATIENT SERVICES | Facility: HOSPITAL | Age: 74
LOS: 1 days | Discharge: HOME | End: 2019-05-13

## 2019-05-13 DIAGNOSIS — C54.1 MALIGNANT NEOPLASM OF ENDOMETRIUM: ICD-10-CM

## 2019-05-14 ENCOUNTER — APPOINTMENT (OUTPATIENT)
Dept: HEMATOLOGY ONCOLOGY | Facility: CLINIC | Age: 74
End: 2019-05-14
Payer: MEDICARE

## 2019-05-14 ENCOUNTER — RESULT REVIEW (OUTPATIENT)
Age: 74
End: 2019-05-14

## 2019-05-14 VITALS
BODY MASS INDEX: 28.81 KG/M2 | RESPIRATION RATE: 16 BRPM | SYSTOLIC BLOOD PRESSURE: 198 MMHG | DIASTOLIC BLOOD PRESSURE: 69 MMHG | OXYGEN SATURATION: 100 % | HEART RATE: 82 BPM | TEMPERATURE: 98.1 F | WEIGHT: 158.51 LBS

## 2019-05-14 DIAGNOSIS — E11.9 TYPE 2 DIABETES MELLITUS W/OUT COMPLICATIONS: ICD-10-CM

## 2019-05-14 LAB
HCT VFR BLD CALC: 29.7 % — LOW (ref 34.5–45)
HGB BLD-MCNC: 10.6 G/DL — LOW (ref 11.5–15.5)
MCHC RBC-ENTMCNC: 30.3 PG — SIGNIFICANT CHANGE UP (ref 27–34)
MCHC RBC-ENTMCNC: 35.7 G/DL — SIGNIFICANT CHANGE UP (ref 32–36)
MCV RBC AUTO: 84.9 FL — SIGNIFICANT CHANGE UP (ref 80–100)
PLATELET # BLD AUTO: 299 K/UL — SIGNIFICANT CHANGE UP (ref 150–400)
RBC # BLD: 3.5 M/UL — LOW (ref 3.8–5.2)
RBC # FLD: 12.8 % — SIGNIFICANT CHANGE UP (ref 10.3–14.5)
WBC # BLD: 5.9 K/UL — SIGNIFICANT CHANGE UP (ref 3.8–10.5)
WBC # FLD AUTO: 5.9 K/UL — SIGNIFICANT CHANGE UP (ref 3.8–10.5)

## 2019-05-14 PROCEDURE — 99215 OFFICE O/P EST HI 40 MIN: CPT

## 2019-05-15 LAB
ALBUMIN SERPL ELPH-MCNC: 4.3 G/DL
ALP BLD-CCNC: 83 U/L
ALT SERPL-CCNC: 12 U/L
ANION GAP SERPL CALC-SCNC: 13 MMOL/L
APTT BLD: 32.8 SEC
AST SERPL-CCNC: 12 U/L
BILIRUB SERPL-MCNC: <0.2 MG/DL
BUN SERPL-MCNC: 24 MG/DL
CALCIUM SERPL-MCNC: 9.6 MG/DL
CANCER AG125 SERPL-ACNC: 233 U/ML
CHLORIDE SERPL-SCNC: 105 MMOL/L
CO2 SERPL-SCNC: 21 MMOL/L
CREAT SERPL-MCNC: 0.67 MG/DL
GLUCOSE SERPL-MCNC: 173 MG/DL
HAV IGM SER QL: NONREACTIVE
HBV CORE IGG+IGM SER QL: NONREACTIVE
HBV CORE IGM SER QL: NONREACTIVE
HBV SURFACE AB SER QL: NONREACTIVE
HBV SURFACE AG SER QL: NONREACTIVE
HCV AB SER QL: NONREACTIVE
HCV S/CO RATIO: 0.06 S/CO
INR PPP: 0.89 RATIO
POTASSIUM SERPL-SCNC: 4.3 MMOL/L
PROT SERPL-MCNC: 7 G/DL
PT BLD: 10 SEC
SODIUM SERPL-SCNC: 139 MMOL/L

## 2019-05-16 NOTE — REASON FOR VISIT
[Follow-Up Visit] : a follow-up [Spouse] : spouse [Family Member] : family member [FreeTextEntry2] : follow up to review adjuvant therapy

## 2019-05-16 NOTE — ASSESSMENT
[FreeTextEntry1] : She is a 74 y/o F with Stage IA serous uterine carcinoma s/p TLH, BSO, pelvic lymph node sampling and omental nodule excision. We reviewed the carboplatin/ paclitaxel every 3 weeks. We will try one treatment and make further adjustments according to her tolerance. We reviewed initial reduction of paclitaxel to 135mg/ m2. She will have nutrition and reflexology visit her on day of treatment. We reviewed low sugar, high fiber diet. We reviewed her blood pressure control which may be affected by premedications: dexamethasone and will try to minimize if no reaction with Cycle 1. We reviewed alpha lipoic acid supplement to start for neuropathy given her history of diabetes. Questions were answered to her and her families' satisfaction. Appointment made for Wed. Next follow up in 3 to 4 weeks.

## 2019-05-16 NOTE — PHYSICAL EXAM
[Restricted in physically strenuous activity but ambulatory and able to carry out work of a light or sedentary nature] : Status 1- Restricted in physically strenuous activity but ambulatory and able to carry out work of a light or sedentary nature, e.g., light house work, office work [Obese] : obese [Normal] : affect appropriate [de-identified] : laparoscopic scars [de-identified] : gait steady

## 2019-05-16 NOTE — REVIEW OF SYSTEMS
[Joint Pain] : joint pain [Negative] : Heme/Lymph [Joint Stiffness] : no joint stiffness [Muscle Pain] : no muscle pain [Muscle Weakness] : no muscle weakness [Dizziness] : no dizziness [Confused] : no confusion [Fainting] : no fainting [Difficulty Walking] : no difficulty walking [de-identified] : tingling over the legs and hands

## 2019-05-16 NOTE — HISTORY OF PRESENT ILLNESS
[Disease: _____________________] : Disease: [unfilled] [T: ___] : T[unfilled] [N: ___] : N[unfilled] [AJCC Stage: ____] : AJCC Stage: [unfilled] [de-identified] : Age 73: Stage IA serous uterine cancer\par She had been having evaluation for PAD with CTA of the abd/ pelvis (mild to moderate stenoses of the R common femoral and superficial femoral arteries and severe stenosis of the distal R superficial femoral artery) and MRI 11/30/18. The MRI showed partially visualized marked abnormal thickening of the endometrium and CTA showing left upper abdominal peritoneal nodules up to 1 cm and indeterminate 1.6 cm adrenal nodule. She underwent endometrial curettage which showed high grade carcinoma with mixed serous and clear cell features involving an endometrial polyp. She had CT of the chest/ abd/ pelvis which again showed stable L adrenal nodule, 6 mm low attenuation left lobe nodule, 1 cm pelvic nodule, and groundglass patchy opacities in the upper lung fields. On 3/15/19, she underwent robotic laparoscopic TLH/ BSO, pelvic and para aortic lymph node sampling and pelvic nodule excision with Dr Mcdowell. The pathology showed serous carcinoma showing superficial invasion of the myometrium, benign pelvic lymph nodes, and fat necrosis with calcification in pelvic nodule. Pelvic wash positive for malignant cells. MSI stable.  [de-identified] : serous carcinoma  [de-identified] :  on 2/22/19= 143 U/ml  [de-identified] : Since last evaluation, she has had time to speak with her family and has decided to try the adjuvant therapy. She has gone to cardiology and continues to have symptoms of PAD. They do not want to do another intervention until chemotherapy complete. She is present with her family members to plan her schedule and review side effects/ concerns.  [Date: ____________] : Patient's last distress assessment performed on [unfilled]. [1 - Distress Level] : Distress Level: 1 [Patient given social work contact information and resource sheet] : Patient was given social work contact information and resource sheet

## 2019-05-17 ENCOUNTER — INBOUND DOCUMENT (OUTPATIENT)
Age: 74
End: 2019-05-17

## 2019-05-22 ENCOUNTER — APPOINTMENT (OUTPATIENT)
Dept: INFUSION THERAPY | Facility: HOSPITAL | Age: 74
End: 2019-05-22

## 2019-05-22 ENCOUNTER — RESULT REVIEW (OUTPATIENT)
Age: 74
End: 2019-05-22

## 2019-05-22 LAB
BASOPHILS # BLD AUTO: 0 K/UL — SIGNIFICANT CHANGE UP (ref 0–0.2)
BASOPHILS NFR BLD AUTO: 0.2 % — SIGNIFICANT CHANGE UP (ref 0–2)
EOSINOPHIL # BLD AUTO: 0.1 K/UL — SIGNIFICANT CHANGE UP (ref 0–0.5)
EOSINOPHIL NFR BLD AUTO: 1.2 % — SIGNIFICANT CHANGE UP (ref 0–6)
HCT VFR BLD CALC: 30 % — LOW (ref 34.5–45)
HGB BLD-MCNC: 10.8 G/DL — LOW (ref 11.5–15.5)
LYMPHOCYTES # BLD AUTO: 0.9 K/UL — LOW (ref 1–3.3)
LYMPHOCYTES # BLD AUTO: 10.7 % — LOW (ref 13–44)
MCHC RBC-ENTMCNC: 30.3 PG — SIGNIFICANT CHANGE UP (ref 27–34)
MCHC RBC-ENTMCNC: 36 G/DL — SIGNIFICANT CHANGE UP (ref 32–36)
MCV RBC AUTO: 84 FL — SIGNIFICANT CHANGE UP (ref 80–100)
MONOCYTES # BLD AUTO: 0 K/UL — SIGNIFICANT CHANGE UP (ref 0–0.9)
MONOCYTES NFR BLD AUTO: 0.4 % — LOW (ref 2–14)
NEUTROPHILS # BLD AUTO: 7.1 K/UL — SIGNIFICANT CHANGE UP (ref 1.8–7.4)
NEUTROPHILS NFR BLD AUTO: 87.6 % — HIGH (ref 43–77)
PLATELET # BLD AUTO: 317 K/UL — SIGNIFICANT CHANGE UP (ref 150–400)
RBC # BLD: 3.58 M/UL — LOW (ref 3.8–5.2)
RBC # FLD: 12.8 % — SIGNIFICANT CHANGE UP (ref 10.3–14.5)
WBC # BLD: 8.2 K/UL — SIGNIFICANT CHANGE UP (ref 3.8–10.5)
WBC # FLD AUTO: 8.2 K/UL — SIGNIFICANT CHANGE UP (ref 3.8–10.5)

## 2019-05-22 RX ORDER — CHOLECALCIFEROL (VITAMIN D3) 125 MCG
1 CAPSULE ORAL
Qty: 0 | Refills: 0 | DISCHARGE

## 2019-05-22 RX ORDER — PITAVASTATIN CALCIUM 1.04 MG/1
1 TABLET, FILM COATED ORAL
Qty: 0 | Refills: 0 | DISCHARGE

## 2019-05-22 RX ORDER — METFORMIN HYDROCHLORIDE 850 MG/1
1 TABLET ORAL
Qty: 0 | Refills: 0 | DISCHARGE

## 2019-05-22 RX ORDER — CARVEDILOL PHOSPHATE 80 MG/1
1 CAPSULE, EXTENDED RELEASE ORAL
Qty: 0 | Refills: 0 | DISCHARGE

## 2019-05-23 DIAGNOSIS — R11.2 NAUSEA WITH VOMITING, UNSPECIFIED: ICD-10-CM

## 2019-05-23 DIAGNOSIS — Z51.11 ENCOUNTER FOR ANTINEOPLASTIC CHEMOTHERAPY: ICD-10-CM

## 2019-06-05 ENCOUNTER — RESULT REVIEW (OUTPATIENT)
Age: 74
End: 2019-06-05

## 2019-06-05 ENCOUNTER — APPOINTMENT (OUTPATIENT)
Dept: HEMATOLOGY ONCOLOGY | Facility: CLINIC | Age: 74
End: 2019-06-05
Payer: MEDICARE

## 2019-06-05 VITALS
SYSTOLIC BLOOD PRESSURE: 193 MMHG | RESPIRATION RATE: 16 BRPM | OXYGEN SATURATION: 98 % | TEMPERATURE: 98 F | BODY MASS INDEX: 28.72 KG/M2 | HEART RATE: 77 BPM | WEIGHT: 158.07 LBS | DIASTOLIC BLOOD PRESSURE: 62 MMHG

## 2019-06-05 DIAGNOSIS — I73.9 PERIPHERAL VASCULAR DISEASE, UNSPECIFIED: ICD-10-CM

## 2019-06-05 DIAGNOSIS — Z11.59 ENCOUNTER FOR SCREENING FOR OTHER VIRAL DISEASES: ICD-10-CM

## 2019-06-05 LAB
HCT VFR BLD CALC: 30.1 % — LOW (ref 34.5–45)
HGB BLD-MCNC: 10.5 G/DL — LOW (ref 11.5–15.5)
MCHC RBC-ENTMCNC: 29.2 PG — SIGNIFICANT CHANGE UP (ref 27–34)
MCHC RBC-ENTMCNC: 34.8 G/DL — SIGNIFICANT CHANGE UP (ref 32–36)
MCV RBC AUTO: 83.9 FL — SIGNIFICANT CHANGE UP (ref 80–100)
PLATELET # BLD AUTO: 312 K/UL — SIGNIFICANT CHANGE UP (ref 150–400)
RBC # BLD: 3.59 M/UL — LOW (ref 3.8–5.2)
RBC # FLD: 12.9 % — SIGNIFICANT CHANGE UP (ref 10.3–14.5)
WBC # BLD: 4.1 K/UL — SIGNIFICANT CHANGE UP (ref 3.8–10.5)
WBC # FLD AUTO: 4.1 K/UL — SIGNIFICANT CHANGE UP (ref 3.8–10.5)

## 2019-06-05 PROCEDURE — 99214 OFFICE O/P EST MOD 30 MIN: CPT

## 2019-06-05 RX ORDER — DEXAMETHASONE 4 MG/1
4 TABLET ORAL
Qty: 10 | Refills: 0 | Status: DISCONTINUED | COMMUNITY
Start: 2019-05-14 | End: 2019-06-05

## 2019-06-06 LAB
ALBUMIN SERPL ELPH-MCNC: 4.3 G/DL
ALP BLD-CCNC: 82 U/L
ALT SERPL-CCNC: 15 U/L
ANION GAP SERPL CALC-SCNC: 13 MMOL/L
AST SERPL-CCNC: 11 U/L
BILIRUB SERPL-MCNC: <0.2 MG/DL
BUN SERPL-MCNC: 20 MG/DL
CALCIUM SERPL-MCNC: 9.2 MG/DL
CHLORIDE SERPL-SCNC: 105 MMOL/L
CO2 SERPL-SCNC: 23 MMOL/L
CREAT SERPL-MCNC: 0.68 MG/DL
GLUCOSE SERPL-MCNC: 227 MG/DL
MAGNESIUM SERPL-MCNC: 1.7 MG/DL
POTASSIUM SERPL-SCNC: 4 MMOL/L
PROT SERPL-MCNC: 6.8 G/DL
SODIUM SERPL-SCNC: 141 MMOL/L

## 2019-06-12 ENCOUNTER — APPOINTMENT (OUTPATIENT)
Dept: INFUSION THERAPY | Facility: HOSPITAL | Age: 74
End: 2019-06-12

## 2019-06-12 ENCOUNTER — RESULT REVIEW (OUTPATIENT)
Age: 74
End: 2019-06-12

## 2019-06-12 LAB
BASOPHILS # BLD AUTO: 0 K/UL — SIGNIFICANT CHANGE UP (ref 0–0.2)
BASOPHILS NFR BLD AUTO: 0.6 % — SIGNIFICANT CHANGE UP (ref 0–2)
EOSINOPHIL # BLD AUTO: 0.6 K/UL — HIGH (ref 0–0.5)
EOSINOPHIL NFR BLD AUTO: 8 % — HIGH (ref 0–6)
HCT VFR BLD CALC: 27.5 % — LOW (ref 34.5–45)
HGB BLD-MCNC: 9.9 G/DL — LOW (ref 11.5–15.5)
LYMPHOCYTES # BLD AUTO: 3 K/UL — SIGNIFICANT CHANGE UP (ref 1–3.3)
LYMPHOCYTES # BLD AUTO: 41.8 % — SIGNIFICANT CHANGE UP (ref 13–44)
MCHC RBC-ENTMCNC: 30.7 PG — SIGNIFICANT CHANGE UP (ref 27–34)
MCHC RBC-ENTMCNC: 35.9 G/DL — SIGNIFICANT CHANGE UP (ref 32–36)
MCV RBC AUTO: 85.4 FL — SIGNIFICANT CHANGE UP (ref 80–100)
MONOCYTES # BLD AUTO: 0.8 K/UL — SIGNIFICANT CHANGE UP (ref 0–0.9)
MONOCYTES NFR BLD AUTO: 11.7 % — SIGNIFICANT CHANGE UP (ref 2–14)
NEUTROPHILS # BLD AUTO: 2.7 K/UL — SIGNIFICANT CHANGE UP (ref 1.8–7.4)
NEUTROPHILS NFR BLD AUTO: 37.9 % — LOW (ref 43–77)
PLATELET # BLD AUTO: 259 K/UL — SIGNIFICANT CHANGE UP (ref 150–400)
RBC # BLD: 3.22 M/UL — LOW (ref 3.8–5.2)
RBC # FLD: 13.5 % — SIGNIFICANT CHANGE UP (ref 10.3–14.5)
WBC # BLD: 7.2 K/UL — SIGNIFICANT CHANGE UP (ref 3.8–10.5)
WBC # FLD AUTO: 7.2 K/UL — SIGNIFICANT CHANGE UP (ref 3.8–10.5)

## 2019-06-14 ENCOUNTER — OTHER (OUTPATIENT)
Age: 74
End: 2019-06-14

## 2019-06-17 NOTE — REVIEW OF SYSTEMS
[Joint Pain] : joint pain [Negative] : Allergic/Immunologic [Joint Stiffness] : no joint stiffness [Muscle Pain] : no muscle pain [Confused] : no confusion [Muscle Weakness] : no muscle weakness [Fainting] : no fainting [Difficulty Walking] : no difficulty walking [Dizziness] : no dizziness [FreeTextEntry2] : reports hair thinning/scalp sensitivity  [de-identified] : tingling over the legs and hands

## 2019-06-17 NOTE — REASON FOR VISIT
[Follow-Up Visit] : a follow-up [Spouse] : spouse [FreeTextEntry2] : Follow up after Cycle 1 Carbo/Taxol

## 2019-06-17 NOTE — ASSESSMENT
[FreeTextEntry1] : She is a 72 y/o F with Stage IA serous uterine carcinoma s/p TLH, BSO, pelvic lymph node sampling and omental nodule excision. We reviewed the carboplatin/ paclitaxel every 3 weeks.  She tolerated C1 with expected fatigue, stable anemia and no significant neuropathic complaints.  Discussed manangemet of these and other expected side effects.  Discuss total alopecia after C2 chemotherapy.   We reviewed alpha lipoic acid supplement to start for neuropathy given her history of diabetes. Questions were answered to her and her families' satisfaction.  Next follow up in 3 to 4 weeks.  Blood work pending and when finalized, proceed to C2 next week.   She will see her dentist this week and may need a tooth pulled.  Advised against extractions first two weeks after chemotherapy and dentist may advise the office.

## 2019-06-17 NOTE — HISTORY OF PRESENT ILLNESS
[Disease: _____________________] : Disease: [unfilled] [T: ___] : T[unfilled] [N: ___] : N[unfilled] [AJCC Stage: ____] : AJCC Stage: [unfilled] [Date: ____________] : Patient's last distress assessment performed on [unfilled]. [1 - Distress Level] : Distress Level: 1 [Patient given social work contact information and resource sheet] : Patient was given social work contact information and resource sheet [de-identified] : Age 73: Stage IA serous uterine cancer\par She had been having evaluation for PAD with CTA of the abd/ pelvis (mild to moderate stenoses of the R common femoral and superficial femoral arteries and severe stenosis of the distal R superficial femoral artery) and MRI 11/30/18. The MRI showed partially visualized marked abnormal thickening of the endometrium and CTA showing left upper abdominal peritoneal nodules up to 1 cm and indeterminate 1.6 cm adrenal nodule. She underwent endometrial curettage which showed high grade carcinoma with mixed serous and clear cell features involving an endometrial polyp. She had CT of the chest/ abd/ pelvis which again showed stable L adrenal nodule, 6 mm low attenuation left lobe nodule, 1 cm pelvic nodule, and groundglass patchy opacities in the upper lung fields. On 3/15/19, she underwent robotic laparoscopic TLH/ BSO, pelvic and para aortic lymph node sampling and pelvic nodule excision with Dr Mcdowell. The pathology showed serous carcinoma showing superficial invasion of the myometrium, benign pelvic lymph nodes, and fat necrosis with calcification in pelvic nodule. Pelvic wash positive for malignant cells. MSI stable.  She initiated Q 3 week  Carbo/Taxol 5.22.19- [de-identified] : serous carcinoma  [de-identified] : CARBO/TAXOL \par C1 5/22/19\par C2 6.12.19\par C3\par C4\par C5\par C6\par  [de-identified] :  on 2/22/19= 143 U/ml  [FreeTextEntry1] : C/T Cycle 1 5.22.19 \par C2  6.12.19\par  [de-identified] : Reports expected fatigue Days 3-5 \par She notes this made it difficult to  her grandkids from school for a few days \par Denies fevers, chills, cough, SOB, CROW\par She is eating and drinking well \par Denies nausea/emesis.\par A few incidents of diarrhea currently resolved\par Denies abdominal pain.bloat/distension\par Denies denies any vaginal bleeding, bruising, hematuria/dysuria\par Denies peripheral neuropathy or gait imbalance\par Stable claudication lower extremities.  No swelling or worsening pain.  Some pain to the feet- pre existing \par present with her family members to plan her schedule and review side effects/ concerns. \par She may need dental evaluation for painful tooth

## 2019-06-17 NOTE — PHYSICAL EXAM
[Restricted in physically strenuous activity but ambulatory and able to carry out work of a light or sedentary nature] : Status 1- Restricted in physically strenuous activity but ambulatory and able to carry out work of a light or sedentary nature, e.g., light house work, office work [Obese] : obese [Normal] : grossly intact [de-identified] : laparoscopic scars [de-identified] : gait steady

## 2019-06-19 ENCOUNTER — OUTPATIENT (OUTPATIENT)
Dept: OUTPATIENT SERVICES | Facility: HOSPITAL | Age: 74
LOS: 1 days | Discharge: HOME | End: 2019-06-19

## 2019-06-19 DIAGNOSIS — C54.1 MALIGNANT NEOPLASM OF ENDOMETRIUM: ICD-10-CM

## 2019-06-19 RX ORDER — HYDRALAZINE HYDROCHLORIDE 25 MG/1
25 TABLET ORAL 3 TIMES DAILY
Qty: 15 | Refills: 0 | Status: DISCONTINUED | COMMUNITY
Start: 2019-06-19 | End: 2019-06-19

## 2019-06-24 ENCOUNTER — RESULT REVIEW (OUTPATIENT)
Age: 74
End: 2019-06-24

## 2019-06-24 ENCOUNTER — APPOINTMENT (OUTPATIENT)
Dept: HEMATOLOGY ONCOLOGY | Facility: CLINIC | Age: 74
End: 2019-06-24
Payer: MEDICARE

## 2019-06-24 VITALS
WEIGHT: 154.32 LBS | SYSTOLIC BLOOD PRESSURE: 145 MMHG | HEART RATE: 77 BPM | TEMPERATURE: 98.1 F | OXYGEN SATURATION: 99 % | DIASTOLIC BLOOD PRESSURE: 65 MMHG | RESPIRATION RATE: 16 BRPM | BODY MASS INDEX: 28.04 KG/M2

## 2019-06-24 LAB
HCT VFR BLD CALC: 26.9 % — LOW (ref 34.5–45)
HGB BLD-MCNC: 9.5 G/DL — LOW (ref 11.5–15.5)
MCHC RBC-ENTMCNC: 30.1 PG — SIGNIFICANT CHANGE UP (ref 27–34)
MCHC RBC-ENTMCNC: 35.5 G/DL — SIGNIFICANT CHANGE UP (ref 32–36)
MCV RBC AUTO: 84.8 FL — SIGNIFICANT CHANGE UP (ref 80–100)
PLATELET # BLD AUTO: 269 K/UL — SIGNIFICANT CHANGE UP (ref 150–400)
RBC # BLD: 3.17 M/UL — LOW (ref 3.8–5.2)
RBC # FLD: 13.6 % — SIGNIFICANT CHANGE UP (ref 10.3–14.5)
WBC # BLD: 4.3 K/UL — SIGNIFICANT CHANGE UP (ref 3.8–10.5)
WBC # FLD AUTO: 4.3 K/UL — SIGNIFICANT CHANGE UP (ref 3.8–10.5)

## 2019-06-24 PROCEDURE — 99215 OFFICE O/P EST HI 40 MIN: CPT

## 2019-06-25 LAB
ALBUMIN SERPL ELPH-MCNC: 4.4 G/DL
ALP BLD-CCNC: 84 U/L
ALT SERPL-CCNC: 18 U/L
ANION GAP SERPL CALC-SCNC: 14 MMOL/L
AST SERPL-CCNC: 13 U/L
BILIRUB SERPL-MCNC: <0.2 MG/DL
BUN SERPL-MCNC: 30 MG/DL
CALCIUM SERPL-MCNC: 9.1 MG/DL
CANCER AG125 SERPL-ACNC: 70 U/ML
CHLORIDE SERPL-SCNC: 108 MMOL/L
CO2 SERPL-SCNC: 20 MMOL/L
CREAT SERPL-MCNC: 0.69 MG/DL
GLUCOSE SERPL-MCNC: 215 MG/DL
MAGNESIUM SERPL-MCNC: 1.8 MG/DL
POTASSIUM SERPL-SCNC: 4.3 MMOL/L
PROT SERPL-MCNC: 6.8 G/DL
SODIUM SERPL-SCNC: 142 MMOL/L

## 2019-06-25 NOTE — REASON FOR VISIT
[Follow-Up Visit] : a follow-up [Spouse] : spouse [Family Member] : family member [FreeTextEntry2] : Follow up after Cycle 2 Carbo/Taxol

## 2019-06-25 NOTE — PHYSICAL EXAM
[Restricted in physically strenuous activity but ambulatory and able to carry out work of a light or sedentary nature] : Status 1- Restricted in physically strenuous activity but ambulatory and able to carry out work of a light or sedentary nature, e.g., light house work, office work [Obese] : obese [Normal] : affect appropriate [de-identified] : laparoscopic scars [de-identified] : gait steady [de-identified] : scratch marks over the arms and legs

## 2019-06-25 NOTE — HISTORY OF PRESENT ILLNESS
[Disease: _____________________] : Disease: [unfilled] [N: ___] : N[unfilled] [T: ___] : T[unfilled] [AJCC Stage: ____] : AJCC Stage: [unfilled] [Date: ____________] : Patient's last distress assessment performed on [unfilled]. [Patient given social work contact information and resource sheet] : Patient was given social work contact information and resource sheet [1 - Distress Level] : Distress Level: 1 [de-identified] :  on 2/22/19= 143 U/ml  [de-identified] : serous carcinoma  [de-identified] : Age 73: Stage IA serous uterine cancer\par She had been having evaluation for PAD with CTA of the abd/ pelvis (mild to moderate stenoses of the R common femoral and superficial femoral arteries and severe stenosis of the distal R superficial femoral artery) and MRI 11/30/18. The MRI showed partially visualized marked abnormal thickening of the endometrium and CTA showing left upper abdominal peritoneal nodules up to 1 cm and indeterminate 1.6 cm adrenal nodule. She underwent endometrial curettage which showed high grade carcinoma with mixed serous and clear cell features involving an endometrial polyp. She had CT of the chest/ abd/ pelvis which again showed stable L adrenal nodule, 6 mm low attenuation left lobe nodule, 1 cm pelvic nodule, and groundglass patchy opacities in the upper lung fields. On 3/15/19, she underwent robotic laparoscopic TLH/ BSO, pelvic and para aortic lymph node sampling and pelvic nodule excision with Dr Mcdowell. The pathology showed serous carcinoma showing superficial invasion of the myometrium, benign pelvic lymph nodes, and fat necrosis with calcification in pelvic nodule. Pelvic wash positive for malignant cells. MSI stable.  She initiated Q 3 week  Carbo/Taxol 5.22.19.  [FreeTextEntry1] : C/T Cycle 2  \par C2  6.12.19\par  [de-identified] : Noted constant itching to the torso, scalp x 1 week: itching started after she completed antibiotic and after taxol chemotherapy. She was given antibiotic for dental extraction. Denied any rash but feels itching underneath the skin over the trunk, arms and legs. Has tried the hydroxyzine 3 times a day but makes her very sleepy and still very itchy. She notes this made it difficult to  her grandkids from school for a few days. Denies fevers, chills, cough, SOB, CROW, nausea/emesis. She is eating and drinking well. No abdominal pain.bloat/distension. She feels the numbness over the legs is worse. Feels more difficulty with walking due to leg heaviness. Her dentist has advised removal of all her lower teeth. Currently she is denying any dental pain.  [de-identified] : CARBO/TAXOL 5/22/19 to 6/12/19\par C3 adjusted to single agent carboplatin due to neuropathy symptoms worsening \par C4\par C5\par C6\par

## 2019-06-25 NOTE — ASSESSMENT
[FreeTextEntry1] : She is a 74 y/o F with Stage IA serous uterine carcinoma s/p TLH, BSO, pelvic lymph node sampling and omental nodule excision. She completed 2nd cycle of carboplatin/ paclitaxel with worsening neuropathy/ leg claudification. We reviewed gabapentin at bedtime and massage of the hands and feet. We reviewed the itching sensation may be related to paclitaxel and will try prednisone pulse and hydroxyzine as needed for itching. We reviewed modification of next cycle of treatment to carboplatin and will re-evaluate leg heaviness sensation. Will check  and CMP. Continue with oral intake and fluids. Next follow up in 3 weeks.

## 2019-06-25 NOTE — REVIEW OF SYSTEMS
[Joint Pain] : joint pain [Negative] : Allergic/Immunologic [Fever] : no fever [Night Sweats] : no night sweats [Chills] : no chills [Fatigue] : fatigue [Joint Stiffness] : no joint stiffness [Recent Change In Weight] : ~T no recent weight change [Muscle Pain] : no muscle pain [Muscle Weakness] : no muscle weakness [Dizziness] : no dizziness [Fainting] : no fainting [Confused] : no confusion [Difficulty Walking] : no difficulty walking [FreeTextEntry2] : reports hair thinning/scalp sensitivity  [de-identified] : tingling over the legs and hands/ heaviness

## 2019-07-03 ENCOUNTER — RESULT REVIEW (OUTPATIENT)
Age: 74
End: 2019-07-03

## 2019-07-03 ENCOUNTER — APPOINTMENT (OUTPATIENT)
Dept: INFUSION THERAPY | Facility: HOSPITAL | Age: 74
End: 2019-07-03

## 2019-07-03 LAB
BASOPHILS # BLD AUTO: 0 K/UL — SIGNIFICANT CHANGE UP (ref 0–0.2)
BASOPHILS NFR BLD AUTO: 0.2 % — SIGNIFICANT CHANGE UP (ref 0–2)
EOSINOPHIL # BLD AUTO: 0.3 K/UL — SIGNIFICANT CHANGE UP (ref 0–0.5)
EOSINOPHIL NFR BLD AUTO: 3.6 % — SIGNIFICANT CHANGE UP (ref 0–6)
HCT VFR BLD CALC: 30.9 % — LOW (ref 34.5–45)
HGB BLD-MCNC: 10.6 G/DL — LOW (ref 11.5–15.5)
LYMPHOCYTES # BLD AUTO: 2.8 K/UL — SIGNIFICANT CHANGE UP (ref 1–3.3)
LYMPHOCYTES # BLD AUTO: 36.2 % — SIGNIFICANT CHANGE UP (ref 13–44)
MCHC RBC-ENTMCNC: 29.5 PG — SIGNIFICANT CHANGE UP (ref 27–34)
MCHC RBC-ENTMCNC: 34.3 G/DL — SIGNIFICANT CHANGE UP (ref 32–36)
MCV RBC AUTO: 85.8 FL — SIGNIFICANT CHANGE UP (ref 80–100)
MONOCYTES # BLD AUTO: 0.7 K/UL — SIGNIFICANT CHANGE UP (ref 0–0.9)
MONOCYTES NFR BLD AUTO: 8.6 % — SIGNIFICANT CHANGE UP (ref 2–14)
NEUTROPHILS # BLD AUTO: 3.9 K/UL — SIGNIFICANT CHANGE UP (ref 1.8–7.4)
NEUTROPHILS NFR BLD AUTO: 51.4 % — SIGNIFICANT CHANGE UP (ref 43–77)
PLATELET # BLD AUTO: 308 K/UL — SIGNIFICANT CHANGE UP (ref 150–400)
RBC # BLD: 3.6 M/UL — LOW (ref 3.8–5.2)
RBC # FLD: 14.4 % — SIGNIFICANT CHANGE UP (ref 10.3–14.5)
WBC # BLD: 7.7 K/UL — SIGNIFICANT CHANGE UP (ref 3.8–10.5)
WBC # FLD AUTO: 7.7 K/UL — SIGNIFICANT CHANGE UP (ref 3.8–10.5)

## 2019-07-05 DIAGNOSIS — R11.2 NAUSEA WITH VOMITING, UNSPECIFIED: ICD-10-CM

## 2019-07-05 DIAGNOSIS — Z51.11 ENCOUNTER FOR ANTINEOPLASTIC CHEMOTHERAPY: ICD-10-CM

## 2019-07-10 ENCOUNTER — APPOINTMENT (OUTPATIENT)
Dept: HEMATOLOGY ONCOLOGY | Facility: CLINIC | Age: 74
End: 2019-07-10
Payer: MEDICARE

## 2019-07-10 ENCOUNTER — RESULT REVIEW (OUTPATIENT)
Age: 74
End: 2019-07-10

## 2019-07-10 VITALS
OXYGEN SATURATION: 98 % | TEMPERATURE: 98.7 F | SYSTOLIC BLOOD PRESSURE: 190 MMHG | HEART RATE: 83 BPM | BODY MASS INDEX: 27.44 KG/M2 | DIASTOLIC BLOOD PRESSURE: 72 MMHG | RESPIRATION RATE: 16 BRPM | WEIGHT: 150.99 LBS

## 2019-07-10 DIAGNOSIS — L29.9 PRURITUS, UNSPECIFIED: ICD-10-CM

## 2019-07-10 LAB
HCT VFR BLD CALC: 29.5 % — LOW (ref 34.5–45)
HGB BLD-MCNC: 10.3 G/DL — LOW (ref 11.5–15.5)
MCHC RBC-ENTMCNC: 30.4 PG — SIGNIFICANT CHANGE UP (ref 27–34)
MCHC RBC-ENTMCNC: 34.9 G/DL — SIGNIFICANT CHANGE UP (ref 32–36)
MCV RBC AUTO: 87.1 FL — SIGNIFICANT CHANGE UP (ref 80–100)
PLATELET # BLD AUTO: 212 K/UL — SIGNIFICANT CHANGE UP (ref 150–400)
RBC # BLD: 3.39 M/UL — LOW (ref 3.8–5.2)
RBC # FLD: 13.8 % — SIGNIFICANT CHANGE UP (ref 10.3–14.5)
WBC # BLD: 5.4 K/UL — SIGNIFICANT CHANGE UP (ref 3.8–10.5)
WBC # FLD AUTO: 5.4 K/UL — SIGNIFICANT CHANGE UP (ref 3.8–10.5)

## 2019-07-10 PROCEDURE — 99214 OFFICE O/P EST MOD 30 MIN: CPT

## 2019-07-10 NOTE — PHYSICAL EXAM
[Restricted in physically strenuous activity but ambulatory and able to carry out work of a light or sedentary nature] : Status 1- Restricted in physically strenuous activity but ambulatory and able to carry out work of a light or sedentary nature, e.g., light house work, office work [Obese] : obese [Normal] : affect appropriate [de-identified] : laparoscopic scars [de-identified] : scratch marks over the arms and legs [de-identified] : gait steady

## 2019-07-10 NOTE — ASSESSMENT
[FreeTextEntry1] : She is a 74 y/o F with Stage IA serous uterine carcinoma s/p TLH, BSO, pelvic lymph node sampling and omental nodule excision. She completed 2nd cycle of carboplatin/ paclitaxel with worsening neuropathy/ leg claudication and sever itching.   We stopped taxane and she was provided w/Gabapentin at bedtime with recommended massage of the hands and feet which has continued to  help .  She notes intermittent numbness to the fingertips which is slightly more noticeable.  She still has some heaviness to the legs but no worsening neuropathic complaints there.  I talked to her about dietary measures for weight loss.  Advised against shark cartilage supplement can thin the blood and will need to be cautious regarding platelets  Will check CA-125 and CMP today Continue with oral intake and fluids. Next follow up in 3 weeks w/ MD Briones.

## 2019-07-10 NOTE — REVIEW OF SYSTEMS
[Fatigue] : fatigue [Joint Pain] : joint pain [Negative] : Heme/Lymph [Fever] : no fever [Night Sweats] : no night sweats [Chills] : no chills [Joint Stiffness] : no joint stiffness [Recent Change In Weight] : ~T no recent weight change [Muscle Pain] : no muscle pain [Muscle Weakness] : no muscle weakness [Dizziness] : no dizziness [Confused] : no confusion [Fainting] : no fainting [FreeTextEntry2] : reports hair thinning/scalp sensitivity  [Difficulty Walking] : no difficulty walking [de-identified] : tingling over the legs and hands/ heaviness

## 2019-07-10 NOTE — REASON FOR VISIT
[Follow-Up Visit] : a follow-up [Family Member] : family member [FreeTextEntry2] : Follow up after Cycle 3 Carbo

## 2019-07-10 NOTE — HISTORY OF PRESENT ILLNESS
[Disease: _____________________] : Disease: [unfilled] [N: ___] : N[unfilled] [T: ___] : T[unfilled] [AJCC Stage: ____] : AJCC Stage: [unfilled] [Patient given social work contact information and resource sheet] : Patient was given social work contact information and resource sheet [de-identified] : Age 73: Stage IA serous uterine cancer\par She had been having evaluation for PAD with CTA of the abd/ pelvis (mild to moderate stenoses of the R common femoral and superficial femoral arteries and severe stenosis of the distal R superficial femoral artery) and MRI 11/30/18. The MRI showed partially visualized marked abnormal thickening of the endometrium and CTA showing left upper abdominal peritoneal nodules up to 1 cm and indeterminate 1.6 cm adrenal nodule. She underwent endometrial curettage which showed high grade carcinoma with mixed serous and clear cell features involving an endometrial polyp. She had CT of the chest/ abd/ pelvis which again showed stable L adrenal nodule, 6 mm low attenuation left lobe nodule, 1 cm pelvic nodule, and groundglass patchy opacities in the upper lung fields. On 3/15/19, she underwent robotic laparoscopic TLH/ BSO, pelvic and para aortic lymph node sampling and pelvic nodule excision with Dr Mcdowell. The pathology showed serous carcinoma showing superficial invasion of the myometrium, benign pelvic lymph nodes, and fat necrosis with calcification in pelvic nodule. Pelvic wash positive for malignant cells. MSI stable.  She initiated Q 3 week  Carbo/Taxol 5.22.19.  [de-identified] : serous carcinoma  [de-identified] :  on 2/22/19= 143 U/ml  [de-identified] : CARBO/TAXOL 5/22/19 to 6/12/19\par C3 adjusted to single agent carboplatin due to neuropathy symptoms worsening \par C4\par C5\par C6\par  [de-identified] : 7/10/19\par She presents with her daughter today \par She thinks the chemotherapy was much stronger\par She thought she will have no symptoms as she received single agent for  C3 -Carboplatin \par She did not have any recurrence of disseminated itching and notes this has completely resolved \par She notes more cumulative fatigue\par She has decreased appetite \par She is hydrating well \par She noted intermittent nausea without emesis. \par She is moving her bowels without incident \par + Flatus\par She is voiding without incident *hematuria, dysuria\par She denies falls or trauma\par Notes that she has felt more fatigued, shaky and anxious\par  [FreeTextEntry1] : C/T Cycle 2  \par C2  6.12.19\par

## 2019-07-11 LAB
ALBUMIN SERPL ELPH-MCNC: 4.5 G/DL
ALP BLD-CCNC: 95 U/L
ALT SERPL-CCNC: 15 U/L
ANION GAP SERPL CALC-SCNC: 16 MMOL/L
AST SERPL-CCNC: 13 U/L
BILIRUB SERPL-MCNC: <0.2 MG/DL
BUN SERPL-MCNC: 29 MG/DL
CALCIUM SERPL-MCNC: 9.9 MG/DL
CANCER AG125 SERPL-ACNC: 64 U/ML
CHLORIDE SERPL-SCNC: 100 MMOL/L
CO2 SERPL-SCNC: 22 MMOL/L
CREAT SERPL-MCNC: 0.71 MG/DL
GLUCOSE SERPL-MCNC: 206 MG/DL
MAGNESIUM SERPL-MCNC: 1.7 MG/DL
POTASSIUM SERPL-SCNC: 4 MMOL/L
PROT SERPL-MCNC: 7.2 G/DL
SODIUM SERPL-SCNC: 138 MMOL/L

## 2019-07-18 ENCOUNTER — APPOINTMENT (OUTPATIENT)
Dept: GYNECOLOGIC ONCOLOGY | Facility: CLINIC | Age: 74
End: 2019-07-18
Payer: MEDICARE

## 2019-07-18 VITALS
WEIGHT: 152 LBS | BODY MASS INDEX: 27.97 KG/M2 | SYSTOLIC BLOOD PRESSURE: 175 MMHG | DIASTOLIC BLOOD PRESSURE: 63 MMHG | HEIGHT: 62 IN | HEART RATE: 81 BPM

## 2019-07-18 PROCEDURE — 99213 OFFICE O/P EST LOW 20 MIN: CPT

## 2019-07-19 ENCOUNTER — RX RENEWAL (OUTPATIENT)
Age: 74
End: 2019-07-19

## 2019-07-19 ENCOUNTER — OUTPATIENT (OUTPATIENT)
Dept: OUTPATIENT SERVICES | Facility: HOSPITAL | Age: 74
LOS: 1 days | Discharge: HOME | End: 2019-07-19

## 2019-07-19 DIAGNOSIS — C54.1 MALIGNANT NEOPLASM OF ENDOMETRIUM: ICD-10-CM

## 2019-07-24 ENCOUNTER — RESULT REVIEW (OUTPATIENT)
Age: 74
End: 2019-07-24

## 2019-07-24 ENCOUNTER — APPOINTMENT (OUTPATIENT)
Dept: INFUSION THERAPY | Facility: HOSPITAL | Age: 74
End: 2019-07-24

## 2019-07-24 LAB
BASOPHILS # BLD AUTO: 0 K/UL — SIGNIFICANT CHANGE UP (ref 0–0.2)
BASOPHILS NFR BLD AUTO: 0.6 % — SIGNIFICANT CHANGE UP (ref 0–2)
EOSINOPHIL # BLD AUTO: 0.2 K/UL — SIGNIFICANT CHANGE UP (ref 0–0.5)
EOSINOPHIL NFR BLD AUTO: 3 % — SIGNIFICANT CHANGE UP (ref 0–6)
HCT VFR BLD CALC: 29 % — LOW (ref 34.5–45)
HGB BLD-MCNC: 10.3 G/DL — LOW (ref 11.5–15.5)
LYMPHOCYTES # BLD AUTO: 2.4 K/UL — SIGNIFICANT CHANGE UP (ref 1–3.3)
LYMPHOCYTES # BLD AUTO: 39.7 % — SIGNIFICANT CHANGE UP (ref 13–44)
MCHC RBC-ENTMCNC: 31.1 PG — SIGNIFICANT CHANGE UP (ref 27–34)
MCHC RBC-ENTMCNC: 35.5 G/DL — SIGNIFICANT CHANGE UP (ref 32–36)
MCV RBC AUTO: 87.5 FL — SIGNIFICANT CHANGE UP (ref 80–100)
MONOCYTES # BLD AUTO: 0.5 K/UL — SIGNIFICANT CHANGE UP (ref 0–0.9)
MONOCYTES NFR BLD AUTO: 8.4 % — SIGNIFICANT CHANGE UP (ref 2–14)
NEUTROPHILS # BLD AUTO: 2.9 K/UL — SIGNIFICANT CHANGE UP (ref 1.8–7.4)
NEUTROPHILS NFR BLD AUTO: 48.3 % — SIGNIFICANT CHANGE UP (ref 43–77)
PLATELET # BLD AUTO: 269 K/UL — SIGNIFICANT CHANGE UP (ref 150–400)
RBC # BLD: 3.32 M/UL — LOW (ref 3.8–5.2)
RBC # FLD: 15.2 % — HIGH (ref 10.3–14.5)
WBC # BLD: 5.9 K/UL — SIGNIFICANT CHANGE UP (ref 3.8–10.5)
WBC # FLD AUTO: 5.9 K/UL — SIGNIFICANT CHANGE UP (ref 3.8–10.5)

## 2019-07-25 DIAGNOSIS — Z51.11 ENCOUNTER FOR ANTINEOPLASTIC CHEMOTHERAPY: ICD-10-CM

## 2019-07-25 DIAGNOSIS — R11.2 NAUSEA WITH VOMITING, UNSPECIFIED: ICD-10-CM

## 2019-07-26 ENCOUNTER — RX RENEWAL (OUTPATIENT)
Age: 74
End: 2019-07-26

## 2019-08-02 ENCOUNTER — RESULT REVIEW (OUTPATIENT)
Age: 74
End: 2019-08-02

## 2019-08-02 ENCOUNTER — APPOINTMENT (OUTPATIENT)
Dept: HEMATOLOGY ONCOLOGY | Facility: CLINIC | Age: 74
End: 2019-08-02
Payer: MEDICARE

## 2019-08-02 VITALS
WEIGHT: 151.46 LBS | SYSTOLIC BLOOD PRESSURE: 196 MMHG | HEART RATE: 79 BPM | BODY MASS INDEX: 27.7 KG/M2 | RESPIRATION RATE: 16 BRPM | TEMPERATURE: 98.9 F | DIASTOLIC BLOOD PRESSURE: 70 MMHG | OXYGEN SATURATION: 98 %

## 2019-08-02 DIAGNOSIS — R23.8 OTHER SKIN CHANGES: ICD-10-CM

## 2019-08-02 LAB
HCT VFR BLD CALC: 28.7 % — LOW (ref 34.5–45)
HGB BLD-MCNC: 9.9 G/DL — LOW (ref 11.5–15.5)
MCHC RBC-ENTMCNC: 30.9 PG — SIGNIFICANT CHANGE UP (ref 27–34)
MCHC RBC-ENTMCNC: 34.5 G/DL — SIGNIFICANT CHANGE UP (ref 32–36)
MCV RBC AUTO: 89.6 FL — SIGNIFICANT CHANGE UP (ref 80–100)
PLATELET # BLD AUTO: 224 K/UL — SIGNIFICANT CHANGE UP (ref 150–400)
RBC # BLD: 3.21 M/UL — LOW (ref 3.8–5.2)
RBC # FLD: 14.9 % — HIGH (ref 10.3–14.5)
WBC # BLD: 4.9 K/UL — SIGNIFICANT CHANGE UP (ref 3.8–10.5)
WBC # FLD AUTO: 4.9 K/UL — SIGNIFICANT CHANGE UP (ref 3.8–10.5)

## 2019-08-02 PROCEDURE — 99215 OFFICE O/P EST HI 40 MIN: CPT

## 2019-08-02 NOTE — REASON FOR VISIT
[Follow-Up Visit] : a follow-up [Family Member] : family member [FreeTextEntry2] : follow up after C 4 of carboplatin

## 2019-08-02 NOTE — ASSESSMENT
[FreeTextEntry1] : She is a 74 y/o F with Stage IA serous uterine carcinoma s/p TLH, BSO, pelvic lymph node sampling and omental nodule excision. She completed 2nd cycle of carboplatin/ paclitaxel with worsening neuropathy/ leg claudification. She was changed to single agent carboplatin and has completed C4 with Grade 2 neuropathy. We reviewed cold therapy during carboplatin infusion and gabapentin 300mg at bedtime. We reviewed that if neuropathy worsens, we will stop treatment and repeat CT imaging. We reviewed her  which is normalizing. She is agreeable to continuing to Cycle 5 as long as she is recovered and her dtr will let us know prior to treatment. She understands that she has chronic neuropathy which is worse from the chemotherapy and this symptom may become chronic. She will continue with alpha lipoic acid and take the gabapentin to help symptoms. \par \par Arthralgias due to chemotherapy: reviewed warm compress and tylenol as needed. \par \par Fatigue: fluid hydration and oral intake encouraged to help recovery. Exercise as tolerated. \par \par Scalp dermatitis due to chemotherapy: clobetasol over the affected area twice a day x 5 days then as needed.

## 2019-08-02 NOTE — HISTORY OF PRESENT ILLNESS
[Disease: _____________________] : Disease: [unfilled] [T: ___] : T[unfilled] [N: ___] : N[unfilled] [AJCC Stage: ____] : AJCC Stage: [unfilled] [de-identified] : Age 73: Stage IA serous uterine cancer\par She had been having evaluation for PAD with CTA of the abd/ pelvis (mild to moderate stenoses of the R common femoral and superficial femoral arteries and severe stenosis of the distal R superficial femoral artery) and MRI 11/30/18. The MRI showed partially visualized marked abnormal thickening of the endometrium and CTA showing left upper abdominal peritoneal nodules up to 1 cm and indeterminate 1.6 cm adrenal nodule. She underwent endometrial curettage which showed high grade carcinoma with mixed serous and clear cell features involving an endometrial polyp. She had CT of the chest/ abd/ pelvis which again showed stable L adrenal nodule, 6 mm low attenuation left lobe nodule, 1 cm pelvic nodule, and groundglass patchy opacities in the upper lung fields. On 3/15/19, she underwent robotic laparoscopic TLH/ BSO, pelvic and para aortic lymph node sampling and pelvic nodule excision with Dr Mcdowell. The pathology showed serous carcinoma showing superficial invasion of the myometrium, benign pelvic lymph nodes, and fat necrosis with calcification in pelvic nodule. Pelvic wash positive for malignant cells. MSI stable.  She initiated Q 3 week  Carbo/Taxol 5.22.19.  [de-identified] :  on 2/22/19= 143 U/ml  [de-identified] : serous carcinoma  [de-identified] : She completed C4 with fatigue, back/ bone achiness, and more tingling of the fingers and legs. She has been using foot soaks to help with leg pain. She has not tried using gabapentin and was not aware this had been sent to pharmacy. She wants to finish the last 2 treatments. She feels her appetite returns by the last 5 days prior to the next chemotherapy and energy is better. She has itching sensation over the scalp and have not tried any medication. She takes Tylenol PM to sleep.  [de-identified] : CARBO/TAXOL 5/22/19 to 6/12/19\par C3 adjusted to single agent carboplatin due to neuropathy symptoms worsening \par C4 carboplatin \par

## 2019-08-02 NOTE — PHYSICAL EXAM
[Restricted in physically strenuous activity but ambulatory and able to carry out work of a light or sedentary nature] : Status 1- Restricted in physically strenuous activity but ambulatory and able to carry out work of a light or sedentary nature, e.g., light house work, office work [Obese] : obese [Normal] : affect appropriate [de-identified] : gait steady [de-identified] : laparoscopic scars

## 2019-08-02 NOTE — REVIEW OF SYSTEMS
[Fatigue] : fatigue [Difficulty Walking] : difficulty walking [Negative] : Allergic/Immunologic [Chills] : no chills [Night Sweats] : no night sweats [Recent Change In Weight] : ~T no recent weight change [Skin Rash] : no skin rash [Confused] : no confusion [Skin Wound] : no skin wound [Fainting] : no fainting [Dizziness] : no dizziness [Insomnia] : insomnia [Suicidal] : not suicidal [Anxiety] : no anxiety [Depression] : no depression [de-identified] : scalp itching sensation  [de-identified] : tingling and numbness in the hands and feet

## 2019-08-05 LAB
ALBUMIN SERPL ELPH-MCNC: 4.3 G/DL
ALP BLD-CCNC: 84 U/L
ALT SERPL-CCNC: 14 U/L
ANION GAP SERPL CALC-SCNC: 13 MMOL/L
AST SERPL-CCNC: 13 U/L
BILIRUB SERPL-MCNC: <0.2 MG/DL
BUN SERPL-MCNC: 27 MG/DL
CALCIUM SERPL-MCNC: 9.8 MG/DL
CANCER AG125 SERPL-ACNC: 53 U/ML
CHLORIDE SERPL-SCNC: 105 MMOL/L
CO2 SERPL-SCNC: 23 MMOL/L
CREAT SERPL-MCNC: 0.81 MG/DL
GLUCOSE SERPL-MCNC: 125 MG/DL
MAGNESIUM SERPL-MCNC: 1.7 MG/DL
POTASSIUM SERPL-SCNC: 4 MMOL/L
PROT SERPL-MCNC: 7.2 G/DL
SODIUM SERPL-SCNC: 141 MMOL/L

## 2019-08-14 ENCOUNTER — RESULT REVIEW (OUTPATIENT)
Age: 74
End: 2019-08-14

## 2019-08-14 ENCOUNTER — APPOINTMENT (OUTPATIENT)
Dept: INFUSION THERAPY | Facility: HOSPITAL | Age: 74
End: 2019-08-14

## 2019-08-14 LAB
BASOPHILS # BLD AUTO: 0 K/UL — SIGNIFICANT CHANGE UP (ref 0–0.2)
BASOPHILS NFR BLD AUTO: 0.1 % — SIGNIFICANT CHANGE UP (ref 0–2)
EOSINOPHIL # BLD AUTO: 0.1 K/UL — SIGNIFICANT CHANGE UP (ref 0–0.5)
EOSINOPHIL NFR BLD AUTO: 2.9 % — SIGNIFICANT CHANGE UP (ref 0–6)
HCT VFR BLD CALC: 27 % — LOW (ref 34.5–45)
HGB BLD-MCNC: 9.4 G/DL — LOW (ref 11.5–15.5)
LYMPHOCYTES # BLD AUTO: 2.5 K/UL — SIGNIFICANT CHANGE UP (ref 1–3.3)
LYMPHOCYTES # BLD AUTO: 51.1 % — HIGH (ref 13–44)
MCHC RBC-ENTMCNC: 31.6 PG — SIGNIFICANT CHANGE UP (ref 27–34)
MCHC RBC-ENTMCNC: 35 G/DL — SIGNIFICANT CHANGE UP (ref 32–36)
MCV RBC AUTO: 90.3 FL — SIGNIFICANT CHANGE UP (ref 80–100)
MONOCYTES # BLD AUTO: 0.5 K/UL — SIGNIFICANT CHANGE UP (ref 0–0.9)
MONOCYTES NFR BLD AUTO: 10.7 % — SIGNIFICANT CHANGE UP (ref 2–14)
NEUTROPHILS # BLD AUTO: 1.7 K/UL — LOW (ref 1.8–7.4)
NEUTROPHILS NFR BLD AUTO: 35.1 % — LOW (ref 43–77)
PLATELET # BLD AUTO: 202 K/UL — SIGNIFICANT CHANGE UP (ref 150–400)
RBC # BLD: 2.99 M/UL — LOW (ref 3.8–5.2)
RBC # FLD: 16 % — HIGH (ref 10.3–14.5)
WBC # BLD: 4.9 K/UL — SIGNIFICANT CHANGE UP (ref 3.8–10.5)
WBC # FLD AUTO: 4.9 K/UL — SIGNIFICANT CHANGE UP (ref 3.8–10.5)

## 2019-08-20 ENCOUNTER — OUTPATIENT (OUTPATIENT)
Dept: OUTPATIENT SERVICES | Facility: HOSPITAL | Age: 74
LOS: 1 days | Discharge: HOME | End: 2019-08-20

## 2019-08-20 DIAGNOSIS — C54.1 MALIGNANT NEOPLASM OF ENDOMETRIUM: ICD-10-CM

## 2019-08-21 ENCOUNTER — APPOINTMENT (OUTPATIENT)
Dept: HEMATOLOGY ONCOLOGY | Facility: CLINIC | Age: 74
End: 2019-08-21
Payer: MEDICARE

## 2019-08-21 ENCOUNTER — RESULT REVIEW (OUTPATIENT)
Age: 74
End: 2019-08-21

## 2019-08-21 VITALS
WEIGHT: 150.99 LBS | OXYGEN SATURATION: 99 % | SYSTOLIC BLOOD PRESSURE: 154 MMHG | HEART RATE: 75 BPM | DIASTOLIC BLOOD PRESSURE: 68 MMHG | RESPIRATION RATE: 16 BRPM | BODY MASS INDEX: 27.62 KG/M2 | TEMPERATURE: 98.1 F

## 2019-08-21 LAB
HCT VFR BLD CALC: 28.3 % — LOW (ref 34.5–45)
HGB BLD-MCNC: 9.8 G/DL — LOW (ref 11.5–15.5)
MCHC RBC-ENTMCNC: 32 PG — SIGNIFICANT CHANGE UP (ref 27–34)
MCHC RBC-ENTMCNC: 34.6 G/DL — SIGNIFICANT CHANGE UP (ref 32–36)
MCV RBC AUTO: 92.5 FL — SIGNIFICANT CHANGE UP (ref 80–100)
PLATELET # BLD AUTO: 222 K/UL — SIGNIFICANT CHANGE UP (ref 150–400)
RBC # BLD: 3.06 M/UL — LOW (ref 3.8–5.2)
RBC # FLD: 16.3 % — HIGH (ref 10.3–14.5)
WBC # BLD: 5.8 K/UL — SIGNIFICANT CHANGE UP (ref 3.8–10.5)
WBC # FLD AUTO: 5.8 K/UL — SIGNIFICANT CHANGE UP (ref 3.8–10.5)

## 2019-08-21 PROCEDURE — 99214 OFFICE O/P EST MOD 30 MIN: CPT

## 2019-08-21 NOTE — HISTORY OF PRESENT ILLNESS
[Disease: _____________________] : Disease: [unfilled] [T: ___] : T[unfilled] [N: ___] : N[unfilled] [AJCC Stage: ____] : AJCC Stage: [unfilled] [de-identified] : Age 73: Stage IA serous uterine cancer\par She had been having evaluation for PAD with CTA of the abd/ pelvis (mild to moderate stenoses of the R common femoral and superficial femoral arteries and severe stenosis of the distal R superficial femoral artery) and MRI 11/30/18. The MRI showed partially visualized marked abnormal thickening of the endometrium and CTA showing left upper abdominal peritoneal nodules up to 1 cm and indeterminate 1.6 cm adrenal nodule. She underwent endometrial curettage which showed high grade carcinoma with mixed serous and clear cell features involving an endometrial polyp. She had CT of the chest/ abd/ pelvis which again showed stable L adrenal nodule, 6 mm low attenuation left lobe nodule, 1 cm pelvic nodule, and groundglass patchy opacities in the upper lung fields. On 3/15/19, she underwent robotic laparoscopic TLH/ BSO, pelvic and para aortic lymph node sampling and pelvic nodule excision with Dr Mcdowell. The pathology showed serous carcinoma showing superficial invasion of the myometrium, benign pelvic lymph nodes, and fat necrosis with calcification in pelvic nodule. Pelvic wash positive for malignant cells. MSI stable.  She initiated Q 3 week  Carbo/Taxol 5.22.19 and changed to carboplatin due to worsening neuropathy.  [de-identified] : serous carcinoma  [de-identified] :  on 2/22/19= 143 U/ml  [de-identified] : CARBO/TAXOL 5/22/19 to 6/12/19\par C3 adjusted to single agent carboplatin due to neuropathy symptoms worsening \par C4 carboplatin \par  [de-identified] : She took the gabapentin during the am which made her fall asleep and not notice the body aches. She felt bone pain over the shoulders, scapula, and legs. She felt the bone pain lasted for 3 days and then resolved. She feels the numbness of the fingers and toes are stable. She feels the numbness affects her ability to grasp items. She is able to walk but sometimes feels she is unsteady. She denies any falls. She is now able to do more housework. She has improved appetite. Taking gabapentin currently at bedtime.

## 2019-08-21 NOTE — REASON FOR VISIT
[Follow-Up Visit] : a follow-up [Family Member] : family member [FreeTextEntry2] : follow up after C5 of carboplatin treatment

## 2019-08-21 NOTE — ASSESSMENT
[FreeTextEntry1] : She is a 74 y/o F with Stage IA serous uterine carcinoma s/p TLH, BSO, pelvic lymph node sampling and omental nodule excision. She completed 2nd cycle of carboplatin/ paclitaxel with worsening neuropathy/ leg claudification. She was changed to single agent carboplatin and has completed C4 with Grade 2 neuropathy. We added gabapentin for C5. We reviewed continuing with cold therapy during carboplatin infusion. We also reviewed continued massage of the hands and feet. Will plan on repeat CT after C6: evaluate disease. We reviewed supportive measures to improve fatigue. Questions answered to her satisfaction. CBC stable. Next follow up in 3 weeks.

## 2019-08-21 NOTE — REVIEW OF SYSTEMS
[Fatigue] : fatigue [Negative] : Allergic/Immunologic [Fever] : no fever [Chills] : no chills [Night Sweats] : no night sweats [Recent Change In Weight] : ~T no recent weight change [Dizziness] : no dizziness [Confused] : no confusion [Fainting] : no fainting [Difficulty Walking] : no difficulty walking [de-identified] : numbness of the feet/ fingers

## 2019-08-21 NOTE — PHYSICAL EXAM
[Restricted in physically strenuous activity but ambulatory and able to carry out work of a light or sedentary nature] : Status 1- Restricted in physically strenuous activity but ambulatory and able to carry out work of a light or sedentary nature, e.g., light house work, office work [Obese] : obese [Normal] : affect appropriate [de-identified] : laparoscopic scars [de-identified] : gait steady

## 2019-08-22 LAB
ALBUMIN SERPL ELPH-MCNC: 4.4 G/DL
ALP BLD-CCNC: 86 U/L
ALT SERPL-CCNC: 15 U/L
ANION GAP SERPL CALC-SCNC: 13 MMOL/L
AST SERPL-CCNC: 13 U/L
BILIRUB SERPL-MCNC: <0.2 MG/DL
BUN SERPL-MCNC: 30 MG/DL
CALCIUM SERPL-MCNC: 9.6 MG/DL
CANCER AG125 SERPL-ACNC: 69 U/ML
CHLORIDE SERPL-SCNC: 105 MMOL/L
CO2 SERPL-SCNC: 23 MMOL/L
CREAT SERPL-MCNC: 0.75 MG/DL
GLUCOSE SERPL-MCNC: 129 MG/DL
MAGNESIUM SERPL-MCNC: 1.7 MG/DL
POTASSIUM SERPL-SCNC: 4.5 MMOL/L
PROT SERPL-MCNC: 7 G/DL
SODIUM SERPL-SCNC: 141 MMOL/L

## 2019-09-04 ENCOUNTER — RESULT REVIEW (OUTPATIENT)
Age: 74
End: 2019-09-04

## 2019-09-04 ENCOUNTER — APPOINTMENT (OUTPATIENT)
Dept: INFUSION THERAPY | Facility: HOSPITAL | Age: 74
End: 2019-09-04

## 2019-09-04 LAB
BASOPHILS # BLD AUTO: 0 K/UL — SIGNIFICANT CHANGE UP (ref 0–0.2)
BASOPHILS NFR BLD AUTO: 0.6 % — SIGNIFICANT CHANGE UP (ref 0–2)
EOSINOPHIL # BLD AUTO: 0.1 K/UL — SIGNIFICANT CHANGE UP (ref 0–0.5)
EOSINOPHIL NFR BLD AUTO: 2.9 % — SIGNIFICANT CHANGE UP (ref 0–6)
HCT VFR BLD CALC: 23 % — LOW (ref 34.5–45)
HGB BLD-MCNC: 8 G/DL — LOW (ref 11.5–15.5)
LYMPHOCYTES # BLD AUTO: 1.7 K/UL — SIGNIFICANT CHANGE UP (ref 1–3.3)
LYMPHOCYTES # BLD AUTO: 39.7 % — SIGNIFICANT CHANGE UP (ref 13–44)
MCHC RBC-ENTMCNC: 32 PG — SIGNIFICANT CHANGE UP (ref 27–34)
MCHC RBC-ENTMCNC: 34.8 G/DL — SIGNIFICANT CHANGE UP (ref 32–36)
MCV RBC AUTO: 92 FL — SIGNIFICANT CHANGE UP (ref 80–100)
MONOCYTES # BLD AUTO: 0.4 K/UL — SIGNIFICANT CHANGE UP (ref 0–0.9)
MONOCYTES NFR BLD AUTO: 10.4 % — SIGNIFICANT CHANGE UP (ref 2–14)
NEUTROPHILS # BLD AUTO: 2 K/UL — SIGNIFICANT CHANGE UP (ref 1.8–7.4)
NEUTROPHILS NFR BLD AUTO: 46.4 % — SIGNIFICANT CHANGE UP (ref 43–77)
PLATELET # BLD AUTO: 199 K/UL — SIGNIFICANT CHANGE UP (ref 150–400)
RBC # BLD: 2.5 M/UL — LOW (ref 3.8–5.2)
RBC # FLD: 16.2 % — HIGH (ref 10.3–14.5)
WBC # BLD: 4.3 K/UL — SIGNIFICANT CHANGE UP (ref 3.8–10.5)
WBC # FLD AUTO: 4.3 K/UL — SIGNIFICANT CHANGE UP (ref 3.8–10.5)

## 2019-09-04 RX ORDER — AMLODIPINE BESYLATE 2.5 MG/1
1 TABLET ORAL
Qty: 0 | Refills: 0 | DISCHARGE

## 2019-09-05 DIAGNOSIS — Z51.11 ENCOUNTER FOR ANTINEOPLASTIC CHEMOTHERAPY: ICD-10-CM

## 2019-09-05 DIAGNOSIS — R11.2 NAUSEA WITH VOMITING, UNSPECIFIED: ICD-10-CM

## 2019-09-11 ENCOUNTER — OUTPATIENT (OUTPATIENT)
Dept: OUTPATIENT SERVICES | Facility: HOSPITAL | Age: 74
LOS: 1 days | End: 2019-09-11
Payer: MEDICARE

## 2019-09-11 ENCOUNTER — RESULT REVIEW (OUTPATIENT)
Age: 74
End: 2019-09-11

## 2019-09-11 ENCOUNTER — APPOINTMENT (OUTPATIENT)
Dept: HEMATOLOGY ONCOLOGY | Facility: CLINIC | Age: 74
End: 2019-09-11

## 2019-09-11 DIAGNOSIS — I82.0 BUDD-CHIARI SYNDROME: ICD-10-CM

## 2019-09-11 LAB
ALBUMIN SERPL ELPH-MCNC: 4.5 G/DL
ALP BLD-CCNC: 85 U/L
ALT SERPL-CCNC: 16 U/L
ANION GAP SERPL CALC-SCNC: 14 MMOL/L
AST SERPL-CCNC: 16 U/L
BILIRUB SERPL-MCNC: <0.2 MG/DL
BLD GP AB SCN SERPL QL: NEGATIVE — SIGNIFICANT CHANGE UP
BUN SERPL-MCNC: 31 MG/DL
CALCIUM SERPL-MCNC: 10 MG/DL
CHLORIDE SERPL-SCNC: 103 MMOL/L
CO2 SERPL-SCNC: 22 MMOL/L
CREAT SERPL-MCNC: 0.77 MG/DL
GLUCOSE SERPL-MCNC: 170 MG/DL
HCT VFR BLD CALC: 27.2 % — LOW (ref 34.5–45)
HGB BLD-MCNC: 9.5 G/DL — LOW (ref 11.5–15.5)
MCHC RBC-ENTMCNC: 32.5 PG — SIGNIFICANT CHANGE UP (ref 27–34)
MCHC RBC-ENTMCNC: 34.8 G/DL — SIGNIFICANT CHANGE UP (ref 32–36)
MCV RBC AUTO: 93.2 FL — SIGNIFICANT CHANGE UP (ref 80–100)
PLATELET # BLD AUTO: 223 K/UL — SIGNIFICANT CHANGE UP (ref 150–400)
POTASSIUM SERPL-SCNC: 4.3 MMOL/L
PROT SERPL-MCNC: 7.2 G/DL
RBC # BLD: 2.92 M/UL — LOW (ref 3.8–5.2)
RBC # FLD: 15.2 % — HIGH (ref 10.3–14.5)
RH IG SCN BLD-IMP: POSITIVE — SIGNIFICANT CHANGE UP
SODIUM SERPL-SCNC: 139 MMOL/L
WBC # BLD: 4.8 K/UL — SIGNIFICANT CHANGE UP (ref 3.8–10.5)
WBC # FLD AUTO: 4.8 K/UL — SIGNIFICANT CHANGE UP (ref 3.8–10.5)

## 2019-09-13 PROCEDURE — 86923 COMPATIBILITY TEST ELECTRIC: CPT

## 2019-09-13 PROCEDURE — 86850 RBC ANTIBODY SCREEN: CPT

## 2019-09-13 PROCEDURE — 86901 BLOOD TYPING SEROLOGIC RH(D): CPT

## 2019-09-13 PROCEDURE — 86900 BLOOD TYPING SEROLOGIC ABO: CPT

## 2019-09-14 ENCOUNTER — APPOINTMENT (OUTPATIENT)
Dept: INFUSION THERAPY | Facility: HOSPITAL | Age: 74
End: 2019-09-14

## 2019-09-18 ENCOUNTER — RESULT REVIEW (OUTPATIENT)
Age: 74
End: 2019-09-18

## 2019-09-18 ENCOUNTER — APPOINTMENT (OUTPATIENT)
Dept: HEMATOLOGY ONCOLOGY | Facility: CLINIC | Age: 74
End: 2019-09-18
Payer: MEDICARE

## 2019-09-18 VITALS
SYSTOLIC BLOOD PRESSURE: 150 MMHG | TEMPERATURE: 98.2 F | DIASTOLIC BLOOD PRESSURE: 74 MMHG | BODY MASS INDEX: 28.43 KG/M2 | OXYGEN SATURATION: 97 % | HEART RATE: 73 BPM | RESPIRATION RATE: 14 BRPM | WEIGHT: 155.43 LBS

## 2019-09-18 DIAGNOSIS — Z79.899 OTHER LONG TERM (CURRENT) DRUG THERAPY: ICD-10-CM

## 2019-09-18 LAB
BASOPHILS # BLD AUTO: 0 K/UL — SIGNIFICANT CHANGE UP (ref 0–0.2)
BASOPHILS NFR BLD AUTO: 0.2 % — SIGNIFICANT CHANGE UP (ref 0–2)
EOSINOPHIL # BLD AUTO: 0.1 K/UL — SIGNIFICANT CHANGE UP (ref 0–0.5)
EOSINOPHIL NFR BLD AUTO: 2.4 % — SIGNIFICANT CHANGE UP (ref 0–6)
HCT VFR BLD CALC: 24.4 % — LOW (ref 34.5–45)
HGB BLD-MCNC: 8.6 G/DL — LOW (ref 11.5–15.5)
LYMPHOCYTES # BLD AUTO: 1.7 K/UL — SIGNIFICANT CHANGE UP (ref 1–3.3)
LYMPHOCYTES # BLD AUTO: 44.8 % — HIGH (ref 13–44)
MCHC RBC-ENTMCNC: 33.5 PG — SIGNIFICANT CHANGE UP (ref 27–34)
MCHC RBC-ENTMCNC: 35.4 G/DL — SIGNIFICANT CHANGE UP (ref 32–36)
MCV RBC AUTO: 94.7 FL — SIGNIFICANT CHANGE UP (ref 80–100)
MONOCYTES # BLD AUTO: 0.3 K/UL — SIGNIFICANT CHANGE UP (ref 0–0.9)
MONOCYTES NFR BLD AUTO: 8.9 % — SIGNIFICANT CHANGE UP (ref 2–14)
NEUTROPHILS # BLD AUTO: 1.7 K/UL — LOW (ref 1.8–7.4)
NEUTROPHILS NFR BLD AUTO: 43.8 % — SIGNIFICANT CHANGE UP (ref 43–77)
PLATELET # BLD AUTO: 186 K/UL — SIGNIFICANT CHANGE UP (ref 150–400)
RBC # BLD: 2.58 M/UL — LOW (ref 3.8–5.2)
RBC # FLD: 15.9 % — HIGH (ref 10.3–14.5)
WBC # BLD: 3.9 K/UL — SIGNIFICANT CHANGE UP (ref 3.8–10.5)
WBC # FLD AUTO: 3.9 K/UL — SIGNIFICANT CHANGE UP (ref 3.8–10.5)

## 2019-09-18 PROCEDURE — 99215 OFFICE O/P EST HI 40 MIN: CPT

## 2019-09-18 NOTE — REASON FOR VISIT
[Follow-Up Visit] : a follow-up [Spouse] : spouse [FreeTextEntry2] : follow up s/p 6th cycle of chemotherapy with diarrhea, fatigue, neuropathy of the hands / feet

## 2019-09-18 NOTE — ASSESSMENT
[FreeTextEntry1] : She is a 74 y/o F with Stage IA serous uterine carcinoma s/p TLH, BSO, pelvic lymph node sampling and omental nodule excision. She completed adjuvant 6 cycles of treatment on 9/9/19 with expected fatigue, diarrhea and stable Grade 2 neuropathy of the feet. We reviewed continued supportive measures. Will have CT imaging to reevaluate adrenal nodule and disease prior to RT evaluation. \par \par Diarrhea: reviewed BRAT diet and fluid hydration. Stop dairy products for 1 week. Will check K and Mg after treatment. \par \par Neuropathy: continue with gabapentin and massage of the hands and feet. We reviewed exercise to help with mobility and agility. \par \par Dizziness due to labile hypertension on amlodipine, clonidine, hydralazine, and chlorthalidone: we reviewed need to check BP at home, she is usually low in the afternoon. If low, we explained to hold the clonidine or hydralazine. If still having diarrhea: hold chlorthalidone that day. Pt expressed understaing. \par

## 2019-09-18 NOTE — REVIEW OF SYSTEMS
[Fatigue] : fatigue [Diarrhea] : diarrhea [Dizziness] : dizziness [Negative] : Allergic/Immunologic [Fever] : no fever [Chills] : no chills [Night Sweats] : no night sweats [Recent Change In Weight] : ~T no recent weight change [Abdominal Pain] : no abdominal pain [Vomiting] : no vomiting [Constipation] : no constipation [Confused] : no confusion [Fainting] : no fainting [Difficulty Walking] : no difficulty walking [de-identified] : numbness of the hands and feet

## 2019-09-18 NOTE — PHYSICAL EXAM
[Ambulatory and capable of all self care but unable to carry out any work activities] : Status 2- Ambulatory and capable of all self care but unable to carry out any work activities. Up and about more than 50% of waking hours [Obese] : obese [Normal] : grossly intact [de-identified] : laparoscopic scars [de-identified] : gait steady; proprioception intact; abduction of the feet 5/5 B;  strength 5/5 B

## 2019-09-18 NOTE — HISTORY OF PRESENT ILLNESS
[Disease: _____________________] : Disease: [unfilled] [T: ___] : T[unfilled] [N: ___] : N[unfilled] [AJCC Stage: ____] : AJCC Stage: [unfilled] [de-identified] : Age 73: Stage IA serous uterine cancer\par She had been having evaluation for PAD with CTA of the abd/ pelvis (mild to moderate stenoses of the R common femoral and superficial femoral arteries and severe stenosis of the distal R superficial femoral artery) and MRI 11/30/18. The MRI showed partially visualized marked abnormal thickening of the endometrium and CTA showing left upper abdominal peritoneal nodules up to 1 cm and indeterminate 1.6 cm adrenal nodule. She underwent endometrial curettage which showed high grade carcinoma with mixed serous and clear cell features involving an endometrial polyp. She had CT of the chest/ abd/ pelvis which again showed stable L adrenal nodule, 6 mm low attenuation left lobe nodule, 1 cm pelvic nodule, and groundglass patchy opacities in the upper lung fields. On 3/15/19, she underwent robotic laparoscopic TLH/ BSO, pelvic and para aortic lymph node sampling and pelvic nodule excision with Dr Mcdowell. The pathology showed serous carcinoma showing superficial invasion of the myometrium, benign pelvic lymph nodes, and fat necrosis with calcification in pelvic nodule. Pelvic wash positive for malignant cells. MSI stable.  She initiated Q 3 week  Carbo/Taxol 5.22.19 and changed to carboplatin due to worsening neuropathy. She had fatigue, diarrhea, and neuropathy with therapy.  [de-identified] : serous carcinoma  [de-identified] :  on 2/22/19= 143 U/ml  [de-identified] : CARBO/TAXOL 5/22/19 to 6/12/19\par C3 adjusted to single agent carboplatin due to neuropathy symptoms worsening \par carboplatin completed 6 cycles 9/9/19\par  [de-identified] : She noticed watery stool: no fevers or blood. She has been trying to eat: had oatmeal with milk this am. She denies any cramping or new abdominal pain. She has been drinking Ensure. She had dizziness with BP at 90 few days ago. Drinking fluids. No falls. Her  got her massage machine for the feet and feet feeling much better. She is using exercise ball for the hands and massaging them. She has numbness over the fingers but has good strength and mobility.

## 2019-09-19 LAB
ALBUMIN SERPL ELPH-MCNC: 4.3 G/DL
ALP BLD-CCNC: 93 U/L
ALT SERPL-CCNC: 13 U/L
ANION GAP SERPL CALC-SCNC: 15 MMOL/L
AST SERPL-CCNC: 11 U/L
BILIRUB SERPL-MCNC: <0.2 MG/DL
BUN SERPL-MCNC: 47 MG/DL
CALCIUM SERPL-MCNC: 9.7 MG/DL
CANCER AG125 SERPL-ACNC: 103 U/ML
CHLORIDE SERPL-SCNC: 103 MMOL/L
CO2 SERPL-SCNC: 20 MMOL/L
CREAT SERPL-MCNC: 0.79 MG/DL
GLUCOSE SERPL-MCNC: 216 MG/DL
MAGNESIUM SERPL-MCNC: 1.7 MG/DL
POTASSIUM SERPL-SCNC: 4.8 MMOL/L
PROT SERPL-MCNC: 7.1 G/DL
SODIUM SERPL-SCNC: 138 MMOL/L

## 2019-10-02 ENCOUNTER — OUTPATIENT (OUTPATIENT)
Dept: OUTPATIENT SERVICES | Facility: HOSPITAL | Age: 74
LOS: 1 days | Discharge: HOME | End: 2019-10-02

## 2019-10-02 DIAGNOSIS — C54.1 MALIGNANT NEOPLASM OF ENDOMETRIUM: ICD-10-CM

## 2019-10-04 ENCOUNTER — APPOINTMENT (OUTPATIENT)
Dept: HEMATOLOGY ONCOLOGY | Facility: CLINIC | Age: 74
End: 2019-10-04
Payer: MEDICARE

## 2019-10-04 ENCOUNTER — RESULT REVIEW (OUTPATIENT)
Age: 74
End: 2019-10-04

## 2019-10-04 ENCOUNTER — FORM ENCOUNTER (OUTPATIENT)
Age: 74
End: 2019-10-04

## 2019-10-04 VITALS
DIASTOLIC BLOOD PRESSURE: 67 MMHG | WEIGHT: 157.63 LBS | HEART RATE: 97 BPM | BODY MASS INDEX: 28.83 KG/M2 | SYSTOLIC BLOOD PRESSURE: 207 MMHG | OXYGEN SATURATION: 98 % | RESPIRATION RATE: 15 BRPM | TEMPERATURE: 97.7 F

## 2019-10-04 DIAGNOSIS — Z00.00 ENCOUNTER FOR GENERAL ADULT MEDICAL EXAMINATION W/OUT ABNORMAL FINDINGS: ICD-10-CM

## 2019-10-04 LAB
ALBUMIN SERPL ELPH-MCNC: 4.7 G/DL
ALP BLD-CCNC: 93 U/L
ALT SERPL-CCNC: 12 U/L
ANION GAP SERPL CALC-SCNC: 14 MMOL/L
AST SERPL-CCNC: 13 U/L
BILIRUB SERPL-MCNC: <0.2 MG/DL
BUN SERPL-MCNC: 21 MG/DL
CALCIUM SERPL-MCNC: 9.8 MG/DL
CANCER AG125 SERPL-ACNC: 177 U/ML
CHLORIDE SERPL-SCNC: 104 MMOL/L
CO2 SERPL-SCNC: 23 MMOL/L
CREAT SERPL-MCNC: 0.79 MG/DL
GLUCOSE SERPL-MCNC: 147 MG/DL
HCT VFR BLD CALC: 27.5 % — LOW (ref 34.5–45)
HGB BLD-MCNC: 9.5 G/DL — LOW (ref 11.5–15.5)
MCHC RBC-ENTMCNC: 33.9 PG — SIGNIFICANT CHANGE UP (ref 27–34)
MCHC RBC-ENTMCNC: 34.7 G/DL — SIGNIFICANT CHANGE UP (ref 32–36)
MCV RBC AUTO: 97.7 FL — SIGNIFICANT CHANGE UP (ref 80–100)
PLATELET # BLD AUTO: 266 K/UL — SIGNIFICANT CHANGE UP (ref 150–400)
POTASSIUM SERPL-SCNC: 4.2 MMOL/L
PROT SERPL-MCNC: 7.4 G/DL
RBC # BLD: 2.82 M/UL — LOW (ref 3.8–5.2)
RBC # FLD: 15.5 % — HIGH (ref 10.3–14.5)
SODIUM SERPL-SCNC: 141 MMOL/L
WBC # BLD: 5 K/UL — SIGNIFICANT CHANGE UP (ref 3.8–10.5)
WBC # FLD AUTO: 5 K/UL — SIGNIFICANT CHANGE UP (ref 3.8–10.5)

## 2019-10-04 PROCEDURE — 99215 OFFICE O/P EST HI 40 MIN: CPT

## 2019-10-05 ENCOUNTER — OUTPATIENT (OUTPATIENT)
Dept: OUTPATIENT SERVICES | Facility: HOSPITAL | Age: 74
LOS: 1 days | End: 2019-10-05
Payer: MEDICARE

## 2019-10-05 ENCOUNTER — APPOINTMENT (OUTPATIENT)
Dept: CT IMAGING | Facility: IMAGING CENTER | Age: 74
End: 2019-10-05
Payer: MEDICARE

## 2019-10-05 DIAGNOSIS — C54.1 MALIGNANT NEOPLASM OF ENDOMETRIUM: ICD-10-CM

## 2019-10-05 DIAGNOSIS — E27.9 DISORDER OF ADRENAL GLAND, UNSPECIFIED: ICD-10-CM

## 2019-10-05 LAB
BASOPHILS # BLD AUTO: 0 K/UL — SIGNIFICANT CHANGE UP (ref 0–0.2)
BASOPHILS NFR BLD AUTO: 0.2 % — SIGNIFICANT CHANGE UP (ref 0–2)
EOSINOPHIL # BLD AUTO: 0.1 K/UL — SIGNIFICANT CHANGE UP (ref 0–0.5)
EOSINOPHIL NFR BLD AUTO: 1.8 % — SIGNIFICANT CHANGE UP (ref 0–6)
LYMPHOCYTES # BLD AUTO: 2.3 K/UL — SIGNIFICANT CHANGE UP (ref 1–3.3)
LYMPHOCYTES # BLD AUTO: 45.2 % — HIGH (ref 13–44)
MONOCYTES # BLD AUTO: 0.5 K/UL — SIGNIFICANT CHANGE UP (ref 0–0.9)
MONOCYTES NFR BLD AUTO: 10.3 % — SIGNIFICANT CHANGE UP (ref 2–14)
NEUTROPHILS # BLD AUTO: 2.1 K/UL — SIGNIFICANT CHANGE UP (ref 1.8–7.4)
NEUTROPHILS NFR BLD AUTO: 42.5 % — LOW (ref 43–77)

## 2019-10-05 PROCEDURE — 74177 CT ABD & PELVIS W/CONTRAST: CPT | Mod: 26

## 2019-10-05 PROCEDURE — 74177 CT ABD & PELVIS W/CONTRAST: CPT

## 2019-10-05 PROCEDURE — 71260 CT THORAX DX C+: CPT

## 2019-10-05 PROCEDURE — 71260 CT THORAX DX C+: CPT | Mod: 26

## 2019-10-09 NOTE — REASON FOR VISIT
[Follow-Up Visit] : a follow-up [Family Member] : family member [FreeTextEntry2] : follow up s/p 6th cycle of chemotherapy with side effects management prior TO RT

## 2019-10-09 NOTE — REVIEW OF SYSTEMS
[Fatigue] : fatigue [Diarrhea] : diarrhea [Dizziness] : dizziness [Negative] : Allergic/Immunologic [Night Sweats] : no night sweats [Chills] : no chills [Fever] : no fever [Vomiting] : no vomiting [Abdominal Pain] : no abdominal pain [Recent Change In Weight] : ~T no recent weight change [Fainting] : no fainting [Confused] : no confusion [Constipation] : no constipation [de-identified] : numbness of the hands and feet  [Difficulty Walking] : no difficulty walking

## 2019-10-09 NOTE — HISTORY OF PRESENT ILLNESS
[T: ___] : T[unfilled] [Disease: _____________________] : Disease: [unfilled] [N: ___] : N[unfilled] [AJCC Stage: ____] : AJCC Stage: [unfilled] [de-identified] : Age 73: Stage IA serous uterine cancer\par She had been having evaluation for PAD with CTA of the abd/ pelvis (mild to moderate stenoses of the R common femoral and superficial femoral arteries and severe stenosis of the distal R superficial femoral artery) and MRI 11/30/18. The MRI showed partially visualized marked abnormal thickening of the endometrium and CTA showing left upper abdominal peritoneal nodules up to 1 cm and indeterminate 1.6 cm adrenal nodule. She underwent endometrial curettage which showed high grade carcinoma with mixed serous and clear cell features involving an endometrial polyp. She had CT of the chest/ abd/ pelvis which again showed stable L adrenal nodule, 6 mm low attenuation left lobe nodule, 1 cm pelvic nodule, and groundglass patchy opacities in the upper lung fields. On 3/15/19, she underwent robotic laparoscopic TLH/ BSO, pelvic and para aortic lymph node sampling and pelvic nodule excision with Dr Mcdowell. The pathology showed serous carcinoma showing superficial invasion of the myometrium, benign pelvic lymph nodes, and fat necrosis with calcification in pelvic nodule. Pelvic wash positive for malignant cells. MSI stable.  She initiated Q 3 week  Carbo/Taxol 5.22.19 and changed to carboplatin due to worsening neuropathy. She had fatigue, diarrhea, and neuropathy with therapy.  [de-identified] : serous carcinoma  [de-identified] :  on 2/22/19= 143 U/ml  [de-identified] : Pt reports feeling much better comparing with last visit, the previous diarrhea subsided, less fatigue more energy, neuropathy on the hands and feet improving, especially at daytime with massage and exercise the numbness sensation almost not noted. We ordered CT scan to evaluate adrenal nodule prior RT for 9/9/19 but pt forgot to make an appointment, and wondering when is her RT will be arranged. Otherwise pt is recovering well from last chemo, eating / drinking normally, BM daily, denies SOB/ Chest pain/ Palpitation/ fever/ chills/nausea / vomiting/ diarrhea / constipation/ pain or any new symptoms since last visit.\par  [de-identified] : CARBO/TAXOL 5/22/19 to 6/12/19\par C3 adjusted to single agent carboplatin due to neuropathy symptoms worsening \par carboplatin completed 6 cycles 9/9/19\par

## 2019-10-09 NOTE — ASSESSMENT
[FreeTextEntry1] : She is a 72 y/o F with Stage IA serous uterine carcinoma s/p TLH, BSO, pelvic lymph node sampling and omental nodule excision. She completed adjuvant 6 cycles of treatment on 9/9/19 with improving symptoms of fatigue, diarrhea and neuropathy of the hands and feet. We scheduled CT imaging to reevaluate adrenal nodule on tomorrow in our location during visit, and will coordinate RT evaluation after the CT results accordingly. Today's CBC stable and improving with blood counts, reassured pt she is recovering on the right direction and will discuss CT report ASAP. Pt verbalized understanding of report any worsening or new symptoms such as abdominal pain / bloating / virginal bleeding etc.Continue with exercise / Gabapentin as needed for neuropathy. She will be in [place] for vacation during 10/27-11/3/19, advised pt to carry necessary medication and practice safety precaution during travel, such as wear mask/ hand hygiene/ avoid crowd to reduce chance of infection. All the questions answered upon pt's satisfaction. \par \par \par

## 2019-10-09 NOTE — PHYSICAL EXAM
[Ambulatory and capable of all self care but unable to carry out any work activities] : Status 2- Ambulatory and capable of all self care but unable to carry out any work activities. Up and about more than 50% of waking hours [Obese] : obese [Normal] : affect appropriate [de-identified] : laparoscopic scars [de-identified] : gait steady; proprioception intact; abduction of the feet 5/5 B;  strength 5/5 B

## 2019-10-17 ENCOUNTER — APPOINTMENT (OUTPATIENT)
Dept: GYNECOLOGIC ONCOLOGY | Facility: CLINIC | Age: 74
End: 2019-10-17
Payer: MEDICARE

## 2019-10-17 VITALS
HEIGHT: 62 IN | SYSTOLIC BLOOD PRESSURE: 176 MMHG | WEIGHT: 156 LBS | BODY MASS INDEX: 28.71 KG/M2 | DIASTOLIC BLOOD PRESSURE: 53 MMHG | HEART RATE: 76 BPM

## 2019-10-17 PROCEDURE — 99213 OFFICE O/P EST LOW 20 MIN: CPT

## 2019-10-17 RX ORDER — HYDROXYZINE HYDROCHLORIDE 25 MG/1
25 TABLET ORAL
Qty: 15 | Refills: 0 | Status: DISCONTINUED | COMMUNITY
Start: 2019-06-19 | End: 2019-10-17

## 2019-10-17 RX ORDER — CLOBETASOL PROPIONATE 0.05 G/100ML
0.05 LOTION TOPICAL TWICE DAILY
Qty: 60 | Refills: 1 | Status: DISCONTINUED | COMMUNITY
Start: 2019-08-02 | End: 2019-10-17

## 2019-10-17 RX ORDER — APREPITANT 80 MG/1
80 CAPSULE ORAL DAILY
Qty: 2 | Refills: 5 | Status: DISCONTINUED | COMMUNITY
Start: 2019-05-14 | End: 2019-10-17

## 2019-10-17 RX ORDER — PROCHLORPERAZINE MALEATE 10 MG/1
10 TABLET ORAL EVERY 6 HOURS
Qty: 30 | Refills: 1 | Status: DISCONTINUED | COMMUNITY
Start: 2019-05-14 | End: 2019-10-17

## 2019-10-17 RX ORDER — ROSUVASTATIN CALCIUM 20 MG/1
20 TABLET, FILM COATED ORAL DAILY
Qty: 30 | Refills: 3 | Status: DISCONTINUED | COMMUNITY
Start: 2019-02-22 | End: 2019-10-17

## 2019-10-17 RX ORDER — GABAPENTIN 300 MG/1
300 CAPSULE ORAL
Qty: 90 | Refills: 1 | Status: DISCONTINUED | COMMUNITY
Start: 2019-06-24 | End: 2019-10-17

## 2019-10-17 RX ORDER — PREDNISONE 10 MG/1
10 TABLET ORAL
Qty: 12 | Refills: 0 | Status: DISCONTINUED | COMMUNITY
Start: 2019-06-24 | End: 2019-10-17

## 2019-10-23 ENCOUNTER — OUTPATIENT (OUTPATIENT)
Dept: OUTPATIENT SERVICES | Facility: HOSPITAL | Age: 74
LOS: 1 days | Discharge: HOME | End: 2019-10-23
Payer: MEDICARE

## 2019-10-24 ENCOUNTER — RX RENEWAL (OUTPATIENT)
Age: 74
End: 2019-10-24

## 2019-10-25 ENCOUNTER — APPOINTMENT (OUTPATIENT)
Dept: RADIATION ONCOLOGY | Facility: CLINIC | Age: 74
End: 2019-10-25
Payer: MEDICARE

## 2019-10-25 VITALS
HEIGHT: 61 IN | RESPIRATION RATE: 16 BRPM | OXYGEN SATURATION: 97 % | HEART RATE: 76 BPM | BODY MASS INDEX: 29.24 KG/M2 | TEMPERATURE: 98.2 F | DIASTOLIC BLOOD PRESSURE: 63 MMHG | SYSTOLIC BLOOD PRESSURE: 161 MMHG | WEIGHT: 154.87 LBS

## 2019-10-25 PROCEDURE — 99204 OFFICE O/P NEW MOD 45 MIN: CPT | Mod: 25

## 2019-10-30 NOTE — HISTORY OF PRESENT ILLNESS
[FreeTextEntry1] : Landy Randolph is a 73 year old female diagnosed with stage IA serous carcinoma s/p surgery in March 2019 and 6 cycles of chemotherapy. She presents today to discuss the role of radiation therapy.\par \par Her oncologic history is as follows:\par \par She has an extensive history of PAD with left limiting claudication, managed by Dr. Saez. She was undergoing evaluation of PAD with MRI on 11/30/18.  The MRI showed partially visualized marked abnormal thickening of the endometrium. \par  \par She was referred to GYN and on 1/30/19, a biopsy of intrauterine mass demonstrated high grade carcinoma with mixed serous and clear cell features involving an endometrial polyp. EMC: detached fragments of serous carcinoma and benign squamous epithelium. ECC: scant fragments suspicious for serous carcinoma. \par \par She underwent a TLH/BSO/pelvic lymph node sampling and omental nodule excision on 3/15/19. Final pathology demonstrated stage IA serous carcinoma showing superficial invasion of the myometrium (2/15 mm). Adenomyosis present and uninvolved by carcinoma. EVITA present. No LVI. No involvement of lymph nodes (0/4). Positive pelvic wash\par \par She received 6 cycles of Carbo/Taxol from 5/22/19 - 9/9/19. Dose was adjusted on cycle 3 due to worsening neuropathy. Follow up CT CAP on 10/5/19 showed a stable 1.5 cm left adrenal adenoma. A few small retroperitoneal nodes are new. 1 x 1.2 cm left lower para-aortic node noted.  10/4/19: 177, previously 103 on 9/18/19. She is seen for discussion of possible radiation treatment.\par \par GYN onc: Yenny\par MED onc: Anselmo

## 2019-10-30 NOTE — REASON FOR VISIT
[Family Member] : family member [Patient Declined  Services] : - None: Patient declined  services [TWNoteComboBox1] : Swedish

## 2019-10-30 NOTE — DISEASE MANAGEMENT
[Pathological] : TNM Stage: p [FreeTextEntry4] : recurrent [TTNM] : 1a [NTNM] : 0 [MTNM] : x [IA] : IA

## 2019-10-30 NOTE — REVIEW OF SYSTEMS
[Fatigue] : fatigue [Joint Pain] : joint pain [Muscle Pain] : muscle pain [Negative] : Allergic/Immunologic [Fatigue: Grade 1 - Fatigue relieved by rest] : Fatigue: Grade 1 - Fatigue relieved by rest [de-identified] : peripheral neuropathy

## 2019-10-30 NOTE — PHYSICAL EXAM
[General Appearance - In No Acute Distress] : in no acute distress [] : no respiratory distress [Abdomen Soft] : soft [Nondistended] : nondistended [No Rectal Mass] : no rectal mass [Normal External Genitalia] : normal external genitalia  [Normal Vaginal Cuff] : vaginal cuff without lesion or nodularity [Cervical Lymph Nodes Enlarged Anterior Bilaterally] : anterior cervical [Supraclavicular Lymph Nodes Enlarged Bilaterally] : supraclavicular

## 2019-11-06 ENCOUNTER — APPOINTMENT (OUTPATIENT)
Dept: NUCLEAR MEDICINE | Facility: IMAGING CENTER | Age: 74
End: 2019-11-06
Payer: MEDICARE

## 2019-11-06 ENCOUNTER — OUTPATIENT (OUTPATIENT)
Dept: OUTPATIENT SERVICES | Facility: HOSPITAL | Age: 74
LOS: 1 days | End: 2019-11-06
Payer: MEDICARE

## 2019-11-06 DIAGNOSIS — C54.1 MALIGNANT NEOPLASM OF ENDOMETRIUM: ICD-10-CM

## 2019-11-06 PROCEDURE — 78815 PET IMAGE W/CT SKULL-THIGH: CPT | Mod: 26,PS

## 2019-11-06 PROCEDURE — 78815 PET IMAGE W/CT SKULL-THIGH: CPT

## 2019-11-06 PROCEDURE — A9552: CPT

## 2019-11-11 NOTE — ASU PREOP CHECKLIST - SKIN PREP
[facility] [place] Progress Note    Subject: Antonio Ramirez hepatic cirrhosis, ongoing alcohol utilization, his wife is present, status post left lower extremity below-knee amputation.  We reviewed the recommended treatment plan from October 28, 2019.  He has a Lymphapress Phlebolymphedema pump which she has been utilizing intermittently, pump is approximately 5 years old, they obtained it while he lived in [place].  They have not been able to obtain the EdemaWear, we did speak with Handi during this visit, his wife spoke with Handi and this will be delivered to his house.  He does have Velcro wraps, she has utilized any elastic wraps in the past.  He has not seen a certified lymphedema therapist for greater than 12 months, we will have an update appointment with a certified lymphedema therapist to coordinate right and left lower examinee lymphedema cares.  His lymphedema press is a  and a simple pump, ideally he would benefit from an  advance pump given the significance of his lower cavity edema and the edema present within the abdomen and the waist.  I will look forward to speaking with the certified lymphedema therapist regarding this option after their evaluation.    Patient Active Problem List   Diagnosis     Alcohol abuse     Alcoholic cirrhosis of liver (H)     Chronic kidney disease, stage III (moderate) (H)     Physical deconditioning     Prostate cancer -- S/P Radiation Seed implants 2000 (no problems since)     Antiphospholipid antibody syndrome (H)     Diffuse large B-cell lymphoma of extranodal site (H)     Chronic congestive heart failure, unspecified congestive heart failure type     Thrombocytopenia -- suspect related to alcohol use     Hyperkalemia     Subdural hematoma (H)     Benign essential hypertension     Malabsorption of iron     Calculi, ureter     Anemia     CAD, S/P CABG in 2007     Aortic stenosis     Nonrheumatic mitral valve regurgitation     Nonrheumatic  tricuspid valve regurgitation     Pulmonary hypertension (H)     Paroxysmal atrial fibrillation (H)     Ischemic cardiomyopathy     Aortic root dilatation (H)     Venous thrombosis     S/P total hip arthroplasty     S/P Left BKA 2017     Acute cystitis without hematuria     Traumatic hematoma of buttock     Acute blood loss anemia     CRF (chronic renal failure), stage 3 (moderate) (H)     MSSA bacteremia     H/O fall, recent     Hematoma of left lower extremity, subsequent encounter     Supratherapeutic INR     H/O deep venous thrombosis     History of pulmonary embolism     Essential hypertension     Chronic systolic congestive heart failure (H)     CKD (chronic kidney disease) stage 3, GFR 30-59 ml/min (H)     Phantom limb pain (H)     Debility     Osteomyelitis (H)     Abnormal liver function test     Altered mental status     Amputation stump complication (H)     Ascites due to alcoholic cirrhosis (H)     Balance problem     Calculus of kidney     Cardiomyopathy (H)     Coagulopathy (H)     Esophageal varices (H)     Hepatic cirrhosis (H)     Hx MRSA infection     Hyperlipidemia LDL goal <70     Long term (current) use of anticoagulants     Lymphoproliferative disorder, low grade B cell (H)     Monoclonal gammopathy     Open wound of foot except toes with tendon involvement     Overweight (BMI 25.0-29.9)     Neuropathy     Peripheral neuropathy, idiopathic     Personal history of malignant neoplasm of prostate     Phlebitis and thrombophlebitis of superficial vessels of lower extremities     Pure hypercholesterolemia     PVD (peripheral vascular disease) (H)     Renal dysfunction     S/P CABG (coronary artery bypass graft)     SOB (shortness of breath) on exertion     Statin intolerance     Therapeutic drug monitoring     Thoracic aortic aneurysm (TAA) (H)     Vitamin D deficiency     Bursitis of left knee     Pulmonary embolism (H)     Arthritis of knee, degenerative     Ulcer of left lower extremity, limited  to breakdown of skin (H)     Lymphedema of both lower extremities     Past Medical History:   Diagnosis Date     Alcoholism (H)      Amputated left leg (H)      Anemia      Anticardiolipin antibody syndrome (H)      Antiphospholipid antibody syndrome (H)      Antiplatelet or antithrombotic long-term use      Aortic root dilatation (H)      Aortic stenosis      Arthritis      Balance problem      Coronary artery disease     CABG 2007: SVG to LAD, SVG to diagonal, SVG to OM; cardiac cath 5/17/18: thrombectomy for restenosis of stents-sent for urgent CABG, cath 5/14/2007: GRECIA x2 to LAD, [place] to diagonal     Gastro-oesophageal reflux disease      Heart attack (H) 2007    apparently arrested, cardiac X5     Hiatal hernia      Hypercholesterolemia      Hypertension      Ischemic cardiomyopathy      Left foot drop      Liver disease     alcoholic liver disease, alcoholic cirrohsosis, portal hypertension     Low grade B cell lymphoproliferative disorder (H)     ?When diagnosed, patient not aware of this     Moderate mitral regurgitation      Monoclonal gammopathy      Neuropathy      Noninfectious ileitis     diverticulitis of colon     Nonrheumatic tricuspid valve regurgitation      Paroxysmal atrial fibrillation (H)      PE (pulmonary embolism) 2006    saddle emboli 2006     Prostate cancer (H) 2000    Treated with radiation (seed implants in 2000) -- no problems since     Pulmonary hypertension (H)      Renal disease     kidney stones     Syncope      Thrombocytopenia (H)      Urethral stricture      Venous thrombosis     IVC filter and lysis procedures 2007     Exam:  /55 (BP Location: Right arm)   Pulse 78   Temp 97.9  F (36.6  C) (Temporal)   Resp 18   Wound (used by OP [facility] only) 10/28/19 0906 Left posterior thigh (Active)   Dressing Appearance moist drainage 11/11/2019 12:00 PM   Drainage Characteristics/Odor serosanguineous 11/11/2019 12:00 PM   Drainage Amount moderate 11/11/2019 12:00 PM     General  appearance is a 75-year-old male, nondistressed, conversant, abdominal ascites, known portal hypertension with liver cirrhosis secondary to alcohol use, ongoing.  Edema left thigh, edema right thigh and right calf, Velcro wraps are in place on the right lower extremity.  Left lower extremity ulceration has near completely epithelialized.        Impression: Incompletely controlled right and left lower extremity lymphedema, hypertension associate with cirrhosis secondary to ongoing alcohol utilization, status post left lower extremity below-knee amputation    Plan: Certified lymphedema therapist reevaluation, complete decongestive physiotherapy discussion, recommended EpiCeram skin cream application twice daily, EdemaWear to wear 24 hours a day 7 days a week, left lower extremity and right lower extremity, use simple pump Lymphapress  over EdemaWear ideally 1 hour twice a day to achieve reduction then work with lymphedema therapist to obtain maintenance using EdemaWear and in elastic Velcro wraps or consider Sigvaris cool flex wraps.  We will follow-up with me in approximately 1 month for reevaluation.  He is also utilizing flavonoid for decreased hyper permeability.  Matt Chaney MD on 11/11/2019 at 1:42 PM       at home/done

## 2019-11-14 PROCEDURE — 77263 THER RADIOLOGY TX PLNG CPLX: CPT

## 2019-11-19 PROCEDURE — 77333 RADIATION TREATMENT AID(S): CPT | Mod: 26

## 2019-12-05 ENCOUNTER — OUTPATIENT (OUTPATIENT)
Dept: OUTPATIENT SERVICES | Facility: HOSPITAL | Age: 74
LOS: 1 days | Discharge: HOME | End: 2019-12-05

## 2019-12-05 DIAGNOSIS — C54.1 MALIGNANT NEOPLASM OF ENDOMETRIUM: ICD-10-CM

## 2019-12-10 PROCEDURE — 77300 RADIATION THERAPY DOSE PLAN: CPT | Mod: 26

## 2019-12-10 PROCEDURE — 88342 IMHCHEM/IMCYTCHM 1ST ANTB: CPT | Mod: 26

## 2019-12-10 PROCEDURE — 77338 DESIGN MLC DEVICE FOR IMRT: CPT | Mod: 26

## 2019-12-10 PROCEDURE — 77301 RADIOTHERAPY DOSE PLAN IMRT: CPT | Mod: 26

## 2019-12-11 ENCOUNTER — APPOINTMENT (OUTPATIENT)
Dept: HEMATOLOGY ONCOLOGY | Facility: CLINIC | Age: 74
End: 2019-12-11
Payer: MEDICARE

## 2019-12-11 VITALS
DIASTOLIC BLOOD PRESSURE: 65 MMHG | HEART RATE: 72 BPM | OXYGEN SATURATION: 100 % | RESPIRATION RATE: 14 BRPM | TEMPERATURE: 97.3 F | BODY MASS INDEX: 29.95 KG/M2 | WEIGHT: 158.51 LBS | SYSTOLIC BLOOD PRESSURE: 153 MMHG

## 2019-12-11 DIAGNOSIS — E27.9 DISORDER OF ADRENAL GLAND, UNSPECIFIED: ICD-10-CM

## 2019-12-11 DIAGNOSIS — R53.83 OTHER FATIGUE: ICD-10-CM

## 2019-12-11 PROCEDURE — 99214 OFFICE O/P EST MOD 30 MIN: CPT

## 2019-12-15 PROBLEM — E27.9 ADRENAL NODULE: Status: ACTIVE | Noted: 2018-10-19

## 2019-12-15 PROBLEM — R53.83 FATIGUE: Status: ACTIVE | Noted: 2019-06-13

## 2019-12-15 NOTE — ASSESSMENT
[FreeTextEntry1] : She is a 74 y/o F with Stage IA serous uterine carcinoma s/p TLH, BSO, pelvic lymph node sampling and omental nodule excision. She completed adjuvant 6 cycles of treatment on 9/9/19 with expected fatigue, diarrhea and stable Grade 2 neuropathy of the feet. We reviewed her Pet/ CT done in November and rationale for pelvic RT. We explained that given her neuropathy and fatigue after prior chemotherapy, we would prefer RT for local control. We reviewed continued supportive measures for neuropathy: baseline neuropathy from PAD worse with taxane which was held after 3 cycles. We reviewed Cymbalta for neuropathy once a day. We reviewed continued massage of the feet and hands. Questions answered to her satisfaction. She will follow up in 2 to 3 months.

## 2019-12-15 NOTE — HISTORY OF PRESENT ILLNESS
[Disease: _____________________] : Disease: [unfilled] [T: ___] : T[unfilled] [N: ___] : N[unfilled] [AJCC Stage: ____] : AJCC Stage: [unfilled] [de-identified] : Age 73: Stage IA serous uterine cancer\par She had been having evaluation for PAD with CTA of the abd/ pelvis (mild to moderate stenoses of the R common femoral and superficial femoral arteries and severe stenosis of the distal R superficial femoral artery) and MRI 11/30/18. The MRI showed partially visualized marked abnormal thickening of the endometrium and CTA showing left upper abdominal peritoneal nodules up to 1 cm and indeterminate 1.6 cm adrenal nodule. She underwent endometrial curettage which showed high grade carcinoma with mixed serous and clear cell features involving an endometrial polyp. She had CT of the chest/ abd/ pelvis which again showed stable L adrenal nodule, 6 mm low attenuation left lobe nodule, 1 cm pelvic nodule, and groundglass patchy opacities in the upper lung fields. On 3/15/19, she underwent robotic laparoscopic TLH/ BSO, pelvic and para aortic lymph node sampling and pelvic nodule excision with Dr Mcdowell. The pathology showed serous carcinoma showing superficial invasion of the myometrium, benign pelvic lymph nodes, and fat necrosis with calcification in pelvic nodule. Pelvic wash positive for malignant cells. MSI stable.  She initiated Q 3 week  Carbo/Taxol 5.22.19 and changed to carboplatin due to worsening neuropathy. She had fatigue, diarrhea, and neuropathy with therapy.  [de-identified] : serous carcinoma  [de-identified] :  on 2/22/19= 143 U/ml  [de-identified] : CARBO/TAXOL 5/22/19 to 6/12/19\par C3 adjusted to single agent carboplatin due to neuropathy symptoms worsening \par carboplatin completed 6 cycles 9/9/19\par  [de-identified] : She will be starting pelvic RT with Dr Hallman. She has occasional lower back pain. She has baseline numbness of the feet and legs: she was able to go on vacation but did not go to the beach: she was worried about the sun. She has energy for ADLs. Denies any constipation or vaginal spotting or bleeding. She is wondering about duration of pelvic RT. She never received Cymbalta from the pharmacy for the neuropathy.

## 2019-12-15 NOTE — PHYSICAL EXAM
[Restricted in physically strenuous activity but ambulatory and able to carry out work of a light or sedentary nature] : Status 1- Restricted in physically strenuous activity but ambulatory and able to carry out work of a light or sedentary nature, e.g., light house work, office work [Obese] : obese [de-identified] : laparoscopic scars [Normal] : affect appropriate [de-identified] : gait steady; proprioception intact; abduction of the feet 5/5 B;  strength 5/5 B

## 2019-12-15 NOTE — REVIEW OF SYSTEMS
[Fever] : no fever [Chills] : no chills [Night Sweats] : no night sweats [Fatigue] : fatigue [Recent Change In Weight] : ~T no recent weight change [Confused] : no confusion [Dizziness] : no dizziness [Fainting] : no fainting [Difficulty Walking] : no difficulty walking [de-identified] : numbness of the feet and legs  [Negative] : Allergic/Immunologic

## 2019-12-16 ENCOUNTER — OTHER (OUTPATIENT)
Age: 74
End: 2019-12-16

## 2019-12-18 PROCEDURE — 77427 RADIATION TX MANAGEMENT X5: CPT

## 2019-12-18 PROCEDURE — 77387B: CUSTOM | Mod: 26

## 2019-12-19 PROCEDURE — 77387B: CUSTOM | Mod: 26

## 2019-12-20 ENCOUNTER — OTHER (OUTPATIENT)
Age: 74
End: 2019-12-20

## 2019-12-20 PROCEDURE — 77387B: CUSTOM | Mod: 26

## 2019-12-23 PROCEDURE — 77387B: CUSTOM | Mod: 26

## 2019-12-24 PROCEDURE — 77014: CPT | Mod: 26

## 2019-12-26 ENCOUNTER — OTHER (OUTPATIENT)
Age: 74
End: 2019-12-26

## 2019-12-26 PROCEDURE — 77387B: CUSTOM | Mod: 26

## 2019-12-27 PROCEDURE — 77387B: CUSTOM | Mod: 26

## 2019-12-30 PROCEDURE — 77387B: CUSTOM | Mod: 26

## 2019-12-31 PROCEDURE — 77014: CPT | Mod: 26

## 2020-01-02 ENCOUNTER — OTHER (OUTPATIENT)
Age: 75
End: 2020-01-02

## 2020-01-02 VITALS
WEIGHT: 153.88 LBS | BODY MASS INDEX: 29.08 KG/M2 | RESPIRATION RATE: 16 BRPM | SYSTOLIC BLOOD PRESSURE: 199 MMHG | OXYGEN SATURATION: 100 % | TEMPERATURE: 36.6 F | HEART RATE: 90 BPM | DIASTOLIC BLOOD PRESSURE: 67 MMHG

## 2020-01-02 PROCEDURE — 77387B: CUSTOM | Mod: 26

## 2020-01-02 PROCEDURE — 77427 RADIATION TX MANAGEMENT X5: CPT

## 2020-01-02 NOTE — REVIEW OF SYSTEMS
[Anal Pain: Grade 0] : Anal Pain: Grade 0 [Constipation: Grade 0] : Constipation: Grade 0 [Diarrhea: Grade 2 - Increase of 4 - 6 stools per day over baseline; moderate increase in ostomy output compared to baseline] : Diarrhea: Grade 2 - Increase of 4 - 6 stools per day over baseline; moderate increase in ostomy output compared to baseline

## 2020-01-02 NOTE — PHYSICAL EXAM
[Normal] : well developed, well nourished, in no acute distress [Sclera] : the sclera and conjunctiva were normal [] : no respiratory distress [Oriented To Time, Place, And Person] : oriented to person, place, and time [Exaggerated Use Of Accessory Muscles For Inspiration] : no accessory muscle use

## 2020-01-02 NOTE — VITALS
[Maximal Pain Intensity: 0/10] : 0/10 [Least Pain Intensity: 0/10] : 0/10 [Pain Description/Quality: ___] : Pain description/quality: [unfilled] [NoTreatment Scheduled] : no treatment scheduled [80: Normal activity with effort; some signs or symptoms of disease.] : 80: Normal activity with effort; some signs or symptoms of disease.  [ECOG Performance Status: 0 - Fully active, able to carry on all pre-disease performance without restriction] : Performance Status: 0 - Fully active, able to carry on all pre-disease performance without restriction

## 2020-01-03 ENCOUNTER — APPOINTMENT (OUTPATIENT)
Dept: HEMATOLOGY ONCOLOGY | Facility: CLINIC | Age: 75
End: 2020-01-03

## 2020-01-03 LAB
ALBUMIN SERPL ELPH-MCNC: 4.2 G/DL
ALP BLD-CCNC: 87 U/L
ALT SERPL-CCNC: 16 U/L
ANION GAP SERPL CALC-SCNC: 12 MMOL/L
AST SERPL-CCNC: 17 U/L
BASOPHILS # BLD AUTO: 0.02 K/UL
BASOPHILS NFR BLD AUTO: 0.6 %
BILIRUB SERPL-MCNC: <0.2 MG/DL
BUN SERPL-MCNC: 17 MG/DL
CALCIUM SERPL-MCNC: 9.3 MG/DL
CHLORIDE SERPL-SCNC: 110 MMOL/L
CO2 SERPL-SCNC: 19 MMOL/L
CREAT SERPL-MCNC: 0.68 MG/DL
EOSINOPHIL # BLD AUTO: 0.15 K/UL
EOSINOPHIL NFR BLD AUTO: 4.7 %
GLUCOSE SERPL-MCNC: 176 MG/DL
HCT VFR BLD CALC: 29.9 %
HGB BLD-MCNC: 9.6 G/DL
IMM GRANULOCYTES NFR BLD AUTO: 0.6 %
LYMPHOCYTES # BLD AUTO: 0.65 K/UL
LYMPHOCYTES NFR BLD AUTO: 20.6 %
MAN DIFF?: NORMAL
MCHC RBC-ENTMCNC: 29.2 PG
MCHC RBC-ENTMCNC: 32.1 GM/DL
MCV RBC AUTO: 90.9 FL
MONOCYTES # BLD AUTO: 0.34 K/UL
MONOCYTES NFR BLD AUTO: 10.8 %
NEUTROPHILS # BLD AUTO: 1.98 K/UL
NEUTROPHILS NFR BLD AUTO: 62.7 %
PLATELET # BLD AUTO: 211 K/UL
POTASSIUM SERPL-SCNC: 4.8 MMOL/L
PROT SERPL-MCNC: 7.1 G/DL
RBC # BLD: 3.29 M/UL
RBC # FLD: 13.3 %
SODIUM SERPL-SCNC: 141 MMOL/L
WBC # FLD AUTO: 3.16 K/UL

## 2020-01-03 PROCEDURE — 77387B: CUSTOM | Mod: 26

## 2020-01-03 NOTE — DISEASE MANAGEMENT
[Pathological] : TNM Stage: p [IA] : IA [TTNM] : 1a [NTNM] : 0 [MTNM] : 0 [de-identified] : 0012 [de-identified] : 9213 [de-identified] : pelvis/ PA nodes

## 2020-01-03 NOTE — HISTORY OF PRESENT ILLNESS
[FreeTextEntry1] : Landy Randolph is a 74 year old female diagnosed with stage IA serous carcinoma s/p surgery in March 2019 and 6 cycles of chemotherapy. She had rising Ca 125 and new retroperitoneal LN compatible with metastatic disease on PET/CT.\par  She is currently receiving radiation to the pelvis and PA she is having diarrhea at the moment about 5 watery stools daily at the moment She is feeling fatigues and is experiencing some abdominal bloating

## 2020-01-06 PROCEDURE — 77387B: CUSTOM | Mod: 26

## 2020-01-07 PROCEDURE — 77014: CPT | Mod: 26

## 2020-01-08 ENCOUNTER — OTHER (OUTPATIENT)
Age: 75
End: 2020-01-08

## 2020-01-08 VITALS
RESPIRATION RATE: 16 BRPM | TEMPERATURE: 98.2 F | HEIGHT: 61 IN | OXYGEN SATURATION: 99 % | WEIGHT: 153.33 LBS | SYSTOLIC BLOOD PRESSURE: 152 MMHG | HEART RATE: 70 BPM | DIASTOLIC BLOOD PRESSURE: 74 MMHG | BODY MASS INDEX: 28.95 KG/M2

## 2020-01-08 PROCEDURE — 77387B: CUSTOM | Mod: 26

## 2020-01-08 RX ORDER — ONDANSETRON 4 MG/1
4 TABLET ORAL EVERY OTHER DAY
Qty: 14 | Refills: 3 | Status: COMPLETED | COMMUNITY
Start: 2020-01-08

## 2020-01-08 NOTE — REASON FOR VISIT
[Routine On-Treatment] : a routine on-treatment visit for [Endometrial Cancer] : endometrial cancer [Spouse] : spouse

## 2020-01-08 NOTE — PHYSICAL EXAM
[Normal] : well developed, well nourished, in no acute distress [Sclera] : the sclera and conjunctiva were normal [] : no respiratory distress [Exaggerated Use Of Accessory Muscles For Inspiration] : no accessory muscle use [Oriented To Time, Place, And Person] : oriented to person, place, and time

## 2020-01-08 NOTE — REVIEW OF SYSTEMS
[Anal Pain: Grade 0] : Anal Pain: Grade 0 [Constipation: Grade 0] : Constipation: Grade 0 [Diarrhea: Grade 2 - Increase of 4 - 6 stools per day over baseline; moderate increase in ostomy output compared to baseline] : Diarrhea: Grade 2 - Increase of 4 - 6 stools per day over baseline; moderate increase in ostomy output compared to baseline [Nausea: Grade 1 - Loss of appetite without alteration in eating habits] : Nausea: Grade 1 - Loss of appetite without alteration in eating habits [Fatigue: Grade 1 - Fatigue relieved by rest] : Fatigue: Grade 1 - Fatigue relieved by rest [Urinary Incontinence: Grade 0] : Urinary Incontinence: Grade 0  [Urinary Retention: Grade 0] : Urinary Retention: Grade 0 [Urinary Tract Pain: Grade 0] : Urinary Tract Pain: Grade 0 [Urinary Urgency: Grade 0] : Urinary Urgency: Grade 0 [Skin Hyperpigmentation: Grade 0] : Skin Hyperpigmentation: Grade 0 [Urinary Frequency: Grade 0] : Urinary Frequency: Grade 0 [Dermatitis Radiation: Grade 0] : Dermatitis Radiation: Grade 0

## 2020-01-09 PROCEDURE — 77387B: CUSTOM | Mod: 26

## 2020-01-09 RX ORDER — ONDANSETRON 4 MG/1
4 TABLET, ORALLY DISINTEGRATING ORAL EVERY 8 HOURS
Qty: 14 | Refills: 0 | Status: ACTIVE | COMMUNITY
Start: 2020-01-09 | End: 1900-01-01

## 2020-01-10 ENCOUNTER — RX CHANGE (OUTPATIENT)
Age: 75
End: 2020-01-10

## 2020-01-10 DIAGNOSIS — R11.0 NAUSEA: ICD-10-CM

## 2020-01-10 PROCEDURE — 77427 RADIATION TX MANAGEMENT X5: CPT

## 2020-01-10 PROCEDURE — 77387B: CUSTOM | Mod: 26

## 2020-01-12 NOTE — DISEASE MANAGEMENT
[Pathological] : TNM Stage: p [IA] : IA [TTNM] : 1a [NTNM] : 0 [MTNM] : 0 [de-identified] : 3501 [de-identified] : 5080 [de-identified] : pelvis/ PA nodes

## 2020-01-12 NOTE — HISTORY OF PRESENT ILLNESS
[FreeTextEntry1] : Landy Randolph is a 74 year old female diagnosed with stage IA serous carcinoma s/p surgery in March 2019 and 6 cycles of chemotherapy. She had rising Ca 125 and new retroperitoneal LN compatible with metastatic disease on PET/CT.\par  \par She is currently receiving radiation to the pelvis and PA . Reports increased fatigue. She reports nausea, no vomiting. Is not taking any antiemetics. No urinary complaints. She reports loose BMs, up to 3-4. Took one Imodium and took flour and water which helped with diarrhea. She reports cramping abdominal pain relieved with Tylenol. \par

## 2020-01-13 PROCEDURE — 77387B: CUSTOM | Mod: 26

## 2020-01-14 ENCOUNTER — OUTPATIENT (OUTPATIENT)
Dept: OUTPATIENT SERVICES | Facility: HOSPITAL | Age: 75
LOS: 1 days | Discharge: HOME | End: 2020-01-14

## 2020-01-14 ENCOUNTER — RESULT REVIEW (OUTPATIENT)
Age: 75
End: 2020-01-14

## 2020-01-14 ENCOUNTER — APPOINTMENT (OUTPATIENT)
Dept: HEMATOLOGY ONCOLOGY | Facility: CLINIC | Age: 75
End: 2020-01-14
Payer: MEDICARE

## 2020-01-14 VITALS
WEIGHT: 154.76 LBS | RESPIRATION RATE: 18 BRPM | OXYGEN SATURATION: 100 % | SYSTOLIC BLOOD PRESSURE: 145 MMHG | DIASTOLIC BLOOD PRESSURE: 54 MMHG | HEART RATE: 62 BPM | BODY MASS INDEX: 29.24 KG/M2

## 2020-01-14 VITALS
OXYGEN SATURATION: 99 % | RESPIRATION RATE: 16 BRPM | BODY MASS INDEX: 29.16 KG/M2 | DIASTOLIC BLOOD PRESSURE: 56 MMHG | TEMPERATURE: 98.1 F | WEIGHT: 154.32 LBS | HEART RATE: 70 BPM | SYSTOLIC BLOOD PRESSURE: 142 MMHG

## 2020-01-14 DIAGNOSIS — R60.0 LOCALIZED EDEMA: ICD-10-CM

## 2020-01-14 DIAGNOSIS — C54.1 MALIGNANT NEOPLASM OF ENDOMETRIUM: ICD-10-CM

## 2020-01-14 LAB
CANCER AG125 SERPL-ACNC: 396 U/ML
HCT VFR BLD CALC: 25.2 % — LOW (ref 34.5–45)
HGB BLD-MCNC: 8.9 G/DL — LOW (ref 11.5–15.5)
MCHC RBC-ENTMCNC: 30.8 PG — SIGNIFICANT CHANGE UP (ref 27–34)
MCHC RBC-ENTMCNC: 35.4 G/DL — SIGNIFICANT CHANGE UP (ref 32–36)
MCV RBC AUTO: 87.1 FL — SIGNIFICANT CHANGE UP (ref 80–100)
PLATELET # BLD AUTO: 194 K/UL — SIGNIFICANT CHANGE UP (ref 150–400)
RBC # BLD: 2.9 M/UL — LOW (ref 3.8–5.2)
RBC # FLD: 13.1 % — SIGNIFICANT CHANGE UP (ref 10.3–14.5)
WBC # BLD: 4.9 K/UL — SIGNIFICANT CHANGE UP (ref 3.8–10.5)
WBC # FLD AUTO: 4.9 K/UL — SIGNIFICANT CHANGE UP (ref 3.8–10.5)

## 2020-01-14 PROCEDURE — 77014: CPT | Mod: 26

## 2020-01-14 PROCEDURE — 99214 OFFICE O/P EST MOD 30 MIN: CPT

## 2020-01-14 RX ORDER — METFORMIN HYDROCHLORIDE 1000 MG/1
1000 TABLET, COATED ORAL
Qty: 180 | Refills: 0 | Status: ACTIVE | COMMUNITY
Start: 2019-04-29

## 2020-01-14 NOTE — REASON FOR VISIT
[Follow-Up Visit] : a follow-up [Spouse] : spouse [FreeTextEntry2] : follow up for uterine cancer receiving pelvic RT

## 2020-01-14 NOTE — PHYSICAL EXAM
[Restricted in physically strenuous activity but ambulatory and able to carry out work of a light or sedentary nature] : Status 1- Restricted in physically strenuous activity but ambulatory and able to carry out work of a light or sedentary nature, e.g., light house work, office work [Obese] : obese [Normal] : affect appropriate [de-identified] : + BS, tenderness on gentle palpation 4 quadrants  [de-identified] : gait steady; decreased sensation over the BLE

## 2020-01-14 NOTE — REVIEW OF SYSTEMS
[Anal Pain: Grade 0] : Anal Pain: Grade 0 [Constipation: Grade 0] : Constipation: Grade 0 [Diarrhea: Grade 2 - Increase of 4 - 6 stools per day over baseline; moderate increase in ostomy output compared to baseline] : Diarrhea: Grade 2 - Increase of 4 - 6 stools per day over baseline; moderate increase in ostomy output compared to baseline [Nausea: Grade 1 - Loss of appetite without alteration in eating habits] : Nausea: Grade 1 - Loss of appetite without alteration in eating habits [Fatigue: Grade 1 - Fatigue relieved by rest] : Fatigue: Grade 1 - Fatigue relieved by rest [Urinary Retention: Grade 0] : Urinary Retention: Grade 0 [Urinary Incontinence: Grade 0] : Urinary Incontinence: Grade 0  [Urinary Urgency: Grade 0] : Urinary Urgency: Grade 0 [Urinary Tract Pain: Grade 0] : Urinary Tract Pain: Grade 0 [Urinary Frequency: Grade 0] : Urinary Frequency: Grade 0 [Skin Hyperpigmentation: Grade 0] : Skin Hyperpigmentation: Grade 0 [Dermatitis Radiation: Grade 0] : Dermatitis Radiation: Grade 0

## 2020-01-14 NOTE — PHYSICAL EXAM
[Sclera] : the sclera and conjunctiva were normal [Normal] : well developed, well nourished, in no acute distress [] : no respiratory distress [Exaggerated Use Of Accessory Muscles For Inspiration] : no accessory muscle use [Oriented To Time, Place, And Person] : oriented to person, place, and time

## 2020-01-14 NOTE — VITALS
[Maximal Pain Intensity: 7/10] : 7/10 [Least Pain Intensity: 5/10] : 5/10 [Pain Description/Quality: ___] : Pain description/quality: [unfilled] [Pain Duration: ___] : Pain duration: [unfilled] [Pain Location: ___] : Pain Location: [unfilled] [NoTreatment Scheduled] : no treatment scheduled [80: Normal activity with effort; some signs or symptoms of disease.] : 80: Normal activity with effort; some signs or symptoms of disease.  [ECOG Performance Status: 0 - Fully active, able to carry on all pre-disease performance without restriction] : Performance Status: 0 - Fully active, able to carry on all pre-disease performance without restriction

## 2020-01-14 NOTE — REVIEW OF SYSTEMS
[Abdominal Pain] : abdominal pain [Diarrhea] : diarrhea [Negative] : Allergic/Immunologic [Vomiting] : no vomiting [Constipation] : no constipation [Confused] : no confusion [Dizziness] : no dizziness [Fainting] : no fainting [Difficulty Walking] : no difficulty walking [de-identified] : numbness of BLE

## 2020-01-14 NOTE — ASSESSMENT
[FreeTextEntry1] : She is a 75 y/o F with Stage IA serous uterine carcinoma s/p TLH, BSO, pelvic lymph node sampling and omental nodule excision. She completed adjuvant 6 cycles of treatment on 9/9/19 and going through pelvic RT. We reviewed continued hydration and BRAT diet. Will check CMP and magnesium today. We reviewed her neuropathy from diabetes and prior carboplatin/ taxol and reviewed continued massage. We will hold duloxetine until completion of RT to see if can be re-introduced at night once GI symptoms are improved. Next follow up in 2 months. \par \par BLE edema: will send compression stockings to [facility]. Although she has water pill, will not increase given pt with diarrhea and dizziness symptoms to avoid additional dehydration.

## 2020-01-14 NOTE — HISTORY OF PRESENT ILLNESS
[Disease: _____________________] : Disease: [unfilled] [T: ___] : T[unfilled] [N: ___] : N[unfilled] [AJCC Stage: ____] : AJCC Stage: [unfilled] [de-identified] : Age 73: Stage IA serous uterine cancer\par She had been having evaluation for PAD with CTA of the abd/ pelvis (mild to moderate stenoses of the R common femoral and superficial femoral arteries and severe stenosis of the distal R superficial femoral artery) and MRI 11/30/18. The MRI showed partially visualized marked abnormal thickening of the endometrium and CTA showing left upper abdominal peritoneal nodules up to 1 cm and indeterminate 1.6 cm adrenal nodule. She underwent endometrial curettage which showed high grade carcinoma with mixed serous and clear cell features involving an endometrial polyp. She had CT of the chest/ abd/ pelvis which again showed stable L adrenal nodule, 6 mm low attenuation left lobe nodule, 1 cm pelvic nodule, and groundglass patchy opacities in the upper lung fields. On 3/15/19, she underwent robotic laparoscopic TLH/ BSO, pelvic and para aortic lymph node sampling and pelvic nodule excision with Dr Mcdowell. The pathology showed serous carcinoma showing superficial invasion of the myometrium, benign pelvic lymph nodes, and fat necrosis with calcification in pelvic nodule. Pelvic wash positive for malignant cells. MSI stable.  She initiated Q 3 week  Carbo/Taxol 5.22.19 and changed to carboplatin due to worsening neuropathy. She had fatigue, diarrhea, and neuropathy with therapy. Interval CT imaging showed new small RP LN with follow up Pet/ CT done 11/2019 showing Pet avid RP LN SUV ranging from 6.3 to 8.1. She was referred to Dr Hallman and started pelvic RT 12/2019.  [de-identified] : serous carcinoma  [de-identified] :  on 2/22/19= 143 U/ml  [de-identified] : CARBO/TAXOL 5/22/19 to 6/12/19\par C3 adjusted to single agent carboplatin due to neuropathy symptoms worsening \par carboplatin completed 6 cycles 9/9/19\par pelvic RT with Dr Hallman [de-identified] : She is having abdominal cramping sensation and notices diarrhea about 3 times/ day. She has been trying to eat bananas. She had dizziness and unsure if related to diarrhea with duloxetine. She stopped the duloxetine and feels the dizziness is better. She is drinking fluids. Has cramping sensation over feet and numbness that spans B legs from knee down to feet. She has decreased sensation over the legs and able to walk but has burning sensation and aching sensation. Legs are OK upon waking but throughout the day, she develops symmetrical swelling of the BLE. She has not tried any compression stockings.

## 2020-01-14 NOTE — REASON FOR VISIT
[Endometrial Cancer] : endometrial cancer [Routine On-Treatment] : a routine on-treatment visit for [Spouse] : spouse

## 2020-01-15 ENCOUNTER — OTHER (OUTPATIENT)
Age: 75
End: 2020-01-15

## 2020-01-15 PROCEDURE — 77387B: CUSTOM | Mod: 26

## 2020-01-16 PROCEDURE — 77387B: CUSTOM | Mod: 26

## 2020-01-17 LAB
ALBUMIN SERPL ELPH-MCNC: 3.8 G/DL
ALP BLD-CCNC: 87 U/L
ALT SERPL-CCNC: 13 U/L
ANION GAP SERPL CALC-SCNC: 12 MMOL/L
AST SERPL-CCNC: 13 U/L
BILIRUB SERPL-MCNC: <0.2 MG/DL
BUN SERPL-MCNC: 12 MG/DL
CALCIUM SERPL-MCNC: 9.1 MG/DL
CHLORIDE SERPL-SCNC: 106 MMOL/L
CO2 SERPL-SCNC: 20 MMOL/L
CREAT SERPL-MCNC: 0.67 MG/DL
GLUCOSE SERPL-MCNC: 166 MG/DL
MAGNESIUM SERPL-MCNC: 1.4 MG/DL
POTASSIUM SERPL-SCNC: 4.3 MMOL/L
PROT SERPL-MCNC: 6.5 G/DL
SODIUM SERPL-SCNC: 138 MMOL/L

## 2020-01-17 PROCEDURE — 77427 RADIATION TX MANAGEMENT X5: CPT

## 2020-01-17 PROCEDURE — 77387B: CUSTOM | Mod: 26

## 2020-01-21 VITALS
DIASTOLIC BLOOD PRESSURE: 65 MMHG | HEART RATE: 70 BPM | OXYGEN SATURATION: 99 % | SYSTOLIC BLOOD PRESSURE: 179 MMHG | RESPIRATION RATE: 16 BRPM

## 2020-01-21 DIAGNOSIS — Z51.0 ENCOUNTER FOR ANTINEOPLASTIC RADIATION THERAPY: ICD-10-CM

## 2020-01-21 PROCEDURE — 77387B: CUSTOM | Mod: 26

## 2020-01-21 NOTE — PHYSICAL EXAM
[Sclera] : the sclera and conjunctiva were normal [] : no respiratory distress [Exaggerated Use Of Accessory Muscles For Inspiration] : no accessory muscle use [Oriented To Time, Place, And Person] : oriented to person, place, and time [Normal] : normoactive bowel sounds, soft and nontender, no hepatosplenomegaly or masses appreciated

## 2020-01-21 NOTE — REVIEW OF SYSTEMS
[Constipation: Grade 0] : Constipation: Grade 0 [Anal Pain: Grade 0] : Anal Pain: Grade 0 [Diarrhea: Grade 2 - Increase of 4 - 6 stools per day over baseline; moderate increase in ostomy output compared to baseline] : Diarrhea: Grade 2 - Increase of 4 - 6 stools per day over baseline; moderate increase in ostomy output compared to baseline [Fatigue: Grade 1 - Fatigue relieved by rest] : Fatigue: Grade 1 - Fatigue relieved by rest [Nausea: Grade 1 - Loss of appetite without alteration in eating habits] : Nausea: Grade 1 - Loss of appetite without alteration in eating habits [Urinary Retention: Grade 0] : Urinary Retention: Grade 0 [Urinary Incontinence: Grade 0] : Urinary Incontinence: Grade 0  [Urinary Tract Pain: Grade 0] : Urinary Tract Pain: Grade 0 [Urinary Urgency: Grade 0] : Urinary Urgency: Grade 0 [Urinary Frequency: Grade 0] : Urinary Frequency: Grade 0 [Skin Hyperpigmentation: Grade 0] : Skin Hyperpigmentation: Grade 0 [Dermatitis Radiation: Grade 0] : Dermatitis Radiation: Grade 0

## 2020-01-21 NOTE — VITALS
[Maximal Pain Intensity: 7/10] : 7/10 [Pain Description/Quality: ___] : Pain description/quality: [unfilled] [Least Pain Intensity: 5/10] : 5/10 [Pain Duration: ___] : Pain duration: [unfilled] [NoTreatment Scheduled] : no treatment scheduled [Pain Location: ___] : Pain Location: [unfilled] [80: Normal activity with effort; some signs or symptoms of disease.] : 80: Normal activity with effort; some signs or symptoms of disease.  [ECOG Performance Status: 0 - Fully active, able to carry on all pre-disease performance without restriction] : Performance Status: 0 - Fully active, able to carry on all pre-disease performance without restriction

## 2020-01-22 LAB
BASOPHILS # BLD AUTO: 0.03 K/UL
BASOPHILS NFR BLD AUTO: 0.8 %
EOSINOPHIL # BLD AUTO: 0.36 K/UL
EOSINOPHIL NFR BLD AUTO: 9.8 %
HCT VFR BLD CALC: 27.5 %
HGB BLD-MCNC: 8.8 G/DL
IMM GRANULOCYTES NFR BLD AUTO: 0.8 %
LYMPHOCYTES # BLD AUTO: 0.51 K/UL
LYMPHOCYTES NFR BLD AUTO: 13.8 %
MAN DIFF?: NORMAL
MCHC RBC-ENTMCNC: 28.4 PG
MCHC RBC-ENTMCNC: 32 GM/DL
MCV RBC AUTO: 88.7 FL
MONOCYTES # BLD AUTO: 0.51 K/UL
MONOCYTES NFR BLD AUTO: 13.8 %
NEUTROPHILS # BLD AUTO: 2.25 K/UL
NEUTROPHILS NFR BLD AUTO: 61 %
PLATELET # BLD AUTO: 226 K/UL
RBC # BLD: 3.1 M/UL
RBC # FLD: 14.1 %
WBC # FLD AUTO: 3.69 K/UL

## 2020-01-22 PROCEDURE — 77014: CPT | Mod: 26

## 2020-01-23 PROCEDURE — 77387B: CUSTOM | Mod: 26

## 2020-01-23 NOTE — HISTORY OF PRESENT ILLNESS
[FreeTextEntry1] : Landy Randolph is a 74 year old female diagnosed with stage IA serous carcinoma s/p surgery in March 2019 and 6 cycles of chemotherapy. She had rising Ca 125 and new retroperitoneal LN compatible with metastatic disease on PET/CT.\par  \par She is currently receiving radiation to the pelvis and PA . Reports increased fatigue. No nausea today but taking antiemetics as needed  No urinary complaints. She had episodes abdominal pain associated with diarrhea over the weekend which subsided

## 2020-01-23 NOTE — DISEASE MANAGEMENT
[Pathological] : TNM Stage: p [IA] : IA [TTNM] : 1a [MTNM] : 0 [de-identified] : 8179 [NTNM] : 0 [de-identified] : 4162 [de-identified] : pelvis/ PA nodes

## 2020-01-24 PROCEDURE — 77387B: CUSTOM | Mod: 26

## 2020-01-26 NOTE — HISTORY OF PRESENT ILLNESS
[FreeTextEntry1] : Landy Randolph is a 74 year old female diagnosed with stage IA serous carcinoma s/p surgery in March 2019 and 6 cycles of chemotherapy. She had rising Ca 125 and new retroperitoneal LN compatible with metastatic disease on PET/CT.\par  \par She is currently receiving radiation to the pelvis and PA . Reports increased fatigue. She reports nausea and taking antiemetics which is helping.  No urinary complaints. She reports loose BMs, up to 3-4. Took one Imodium which helped. She had fried food yesterday which upset her stomach. Continues to experience abdominal cramping which is relieved with Tylenol\par

## 2020-01-26 NOTE — DISEASE MANAGEMENT
[Pathological] : TNM Stage: p [IA] : IA [TTNM] : 1a [NTNM] : 0 [MTNM] : 0 [de-identified] : 3749 [de-identified] : 6478 [de-identified] : pelvis/ PA nodes

## 2020-01-27 PROCEDURE — 77387B: CUSTOM | Mod: 26

## 2020-01-27 PROCEDURE — 77427 RADIATION TX MANAGEMENT X5: CPT

## 2020-01-28 ENCOUNTER — RX RENEWAL (OUTPATIENT)
Age: 75
End: 2020-01-28

## 2020-01-28 PROCEDURE — 77014: CPT | Mod: 26

## 2020-01-29 ENCOUNTER — OTHER (OUTPATIENT)
Age: 75
End: 2020-01-29

## 2020-01-29 PROCEDURE — 77387B: CUSTOM | Mod: 26

## 2020-01-29 NOTE — PHYSICAL EXAM
[Sclera] : the sclera and conjunctiva were normal [] : no respiratory distress [Exaggerated Use Of Accessory Muscles For Inspiration] : no accessory muscle use [Normal] : normoactive bowel sounds, soft and nontender, no hepatosplenomegaly or masses appreciated [Oriented To Time, Place, And Person] : oriented to person, place, and time

## 2020-01-29 NOTE — REVIEW OF SYSTEMS
[Anal Pain: Grade 0] : Anal Pain: Grade 0 [Constipation: Grade 0] : Constipation: Grade 0 [Diarrhea: Grade 2 - Increase of 4 - 6 stools per day over baseline; moderate increase in ostomy output compared to baseline] : Diarrhea: Grade 2 - Increase of 4 - 6 stools per day over baseline; moderate increase in ostomy output compared to baseline [Nausea: Grade 1 - Loss of appetite without alteration in eating habits] : Nausea: Grade 1 - Loss of appetite without alteration in eating habits [Fatigue: Grade 1 - Fatigue relieved by rest] : Fatigue: Grade 1 - Fatigue relieved by rest [Urinary Incontinence: Grade 0] : Urinary Incontinence: Grade 0  [Urinary Retention: Grade 0] : Urinary Retention: Grade 0 [Urinary Tract Pain: Grade 0] : Urinary Tract Pain: Grade 0 [Urinary Urgency: Grade 0] : Urinary Urgency: Grade 0 [Urinary Frequency: Grade 0] : Urinary Frequency: Grade 0 [Skin Hyperpigmentation: Grade 0] : Skin Hyperpigmentation: Grade 0 [Dermatitis Radiation: Grade 0] : Dermatitis Radiation: Grade 0

## 2020-02-09 NOTE — HISTORY OF PRESENT ILLNESS
[FreeTextEntry1] : Landy Randolph is a 74 year old female diagnosed with stage IA serous carcinoma s/p surgery in March 2019 and 6 cycles of chemotherapy. She had rising Ca 125 and new retroperitoneal LN compatible with metastatic disease on PET/CT.\par  \par She completed radiation to the pelvis and PA . Reports increased fatigue. No nausea today but taking antiemetics as needed  No urinary complaints. She had episodes abdominal pain associated with diarrhea over the weekend which subsided . Discharge instructions provided. She is taking 2 doses of immodium a day.

## 2020-02-09 NOTE — DISEASE MANAGEMENT
[Pathological] : TNM Stage: p [IA] : IA [TTNM] : 1a [NTNM] : 0 [MTNM] : 0 [de-identified] : 0265 [de-identified] : 2855 [de-identified] : pelvis/ PA nodes

## 2020-03-03 ENCOUNTER — APPOINTMENT (OUTPATIENT)
Dept: RADIATION ONCOLOGY | Facility: CLINIC | Age: 75
End: 2020-03-03
Payer: MEDICARE

## 2020-03-03 VITALS
TEMPERATURE: 36.4 F | HEART RATE: 73 BPM | SYSTOLIC BLOOD PRESSURE: 130 MMHG | OXYGEN SATURATION: 98 % | RESPIRATION RATE: 16 BRPM | WEIGHT: 147.38 LBS | BODY MASS INDEX: 27.85 KG/M2 | DIASTOLIC BLOOD PRESSURE: 64 MMHG

## 2020-03-03 PROCEDURE — 99024 POSTOP FOLLOW-UP VISIT: CPT | Mod: GC

## 2020-03-03 NOTE — REVIEW OF SYSTEMS
[Constipation: Grade 0] : Constipation: Grade 0 [Diarrhea: Grade 0] : Diarrhea: Grade 0 [Nausea: Grade 0] : Nausea: Grade 0 [Urinary Incontinence: Grade 0] : Urinary Incontinence: Grade 0  [Fatigue: Grade 0] : Fatigue: Grade 0 [Urinary Retention: Grade 0] : Urinary Retention: Grade 0 [Urinary Tract Pain: Grade 0] : Urinary Tract Pain: Grade 0 [Urinary Urgency: Grade 0] : Urinary Urgency: Grade 0 [Urinary Frequency: Grade 0] : Urinary Frequency: Grade 0

## 2020-03-06 ENCOUNTER — FORM ENCOUNTER (OUTPATIENT)
Age: 75
End: 2020-03-06

## 2020-03-07 ENCOUNTER — APPOINTMENT (OUTPATIENT)
Dept: CT IMAGING | Facility: IMAGING CENTER | Age: 75
End: 2020-03-07
Payer: MEDICARE

## 2020-03-07 ENCOUNTER — OUTPATIENT (OUTPATIENT)
Dept: OUTPATIENT SERVICES | Facility: HOSPITAL | Age: 75
LOS: 1 days | End: 2020-03-07
Payer: MEDICARE

## 2020-03-07 DIAGNOSIS — C54.1 MALIGNANT NEOPLASM OF ENDOMETRIUM: ICD-10-CM

## 2020-03-07 PROCEDURE — 72193 CT PELVIS W/DYE: CPT | Mod: 26

## 2020-03-07 PROCEDURE — 72193 CT PELVIS W/DYE: CPT

## 2020-03-16 NOTE — PHYSICAL EXAM
[Normal External Genitalia] : normal external genitalia  [Inguinal Lymph Nodes Enlarged Bilaterally] : inguinal [Normal] : oriented to person, place and time, the affect was normal, the mood was normal and not anxious [] : no respiratory distress [Abdomen Soft] : soft [Nondistended] : nondistended [Abdomen Tenderness] : non-tender [Normal Vaginal Cuff] : vaginal cuff without lesion or nodularity

## 2020-03-16 NOTE — DISEASE MANAGEMENT
[Pathological] : TNM Stage: p [FreeTextEntry4] : recurrent [TTNM] : 1a [NTNM] : 0 [MTNM] : 0 [IA] : IA

## 2020-03-16 NOTE — HISTORY OF PRESENT ILLNESS
[FreeTextEntry1] : Landy Randolph is a 74 year old female diagnosed with stage IA serous carcinoma s/p surgery in March 2019 and 6 cycles of chemotherapy. She had rising Ca 125 and new retroperitoneal LN compatible with metastatic disease on PET/CT. She received radiation to the pelvis/ PA nodes to a total dose of 5936 cGy in 28 fractions. \par \par She returns for a post treatment evaluation. She is starting to feel better. Appetite is returning. No urinary complaints. Diarrhea has subsided. \par

## 2020-03-17 ENCOUNTER — RESULT REVIEW (OUTPATIENT)
Age: 75
End: 2020-03-17

## 2020-03-17 ENCOUNTER — OUTPATIENT (OUTPATIENT)
Dept: OUTPATIENT SERVICES | Facility: HOSPITAL | Age: 75
LOS: 1 days | Discharge: HOME | End: 2020-03-17

## 2020-03-17 ENCOUNTER — APPOINTMENT (OUTPATIENT)
Dept: HEMATOLOGY ONCOLOGY | Facility: CLINIC | Age: 75
End: 2020-03-17
Payer: MEDICARE

## 2020-03-17 VITALS
BODY MASS INDEX: 28.05 KG/M2 | HEART RATE: 79 BPM | OXYGEN SATURATION: 98 % | WEIGHT: 148.46 LBS | RESPIRATION RATE: 16 BRPM | DIASTOLIC BLOOD PRESSURE: 66 MMHG | SYSTOLIC BLOOD PRESSURE: 209 MMHG | TEMPERATURE: 97.9 F

## 2020-03-17 VITALS — SYSTOLIC BLOOD PRESSURE: 149 MMHG | DIASTOLIC BLOOD PRESSURE: 71 MMHG

## 2020-03-17 DIAGNOSIS — C54.1 MALIGNANT NEOPLASM OF ENDOMETRIUM: ICD-10-CM

## 2020-03-17 DIAGNOSIS — I73.9 PERIPHERAL VASCULAR DISEASE, UNSPECIFIED: ICD-10-CM

## 2020-03-17 DIAGNOSIS — I10 ESSENTIAL (PRIMARY) HYPERTENSION: ICD-10-CM

## 2020-03-17 LAB
ALBUMIN SERPL ELPH-MCNC: 4.3 G/DL
ALP BLD-CCNC: 146 U/L
ALT SERPL-CCNC: 22 U/L
ANION GAP SERPL CALC-SCNC: 13 MMOL/L
AST SERPL-CCNC: 23 U/L
BASOPHILS # BLD AUTO: 0.03 K/UL — SIGNIFICANT CHANGE UP (ref 0–0.2)
BASOPHILS NFR BLD AUTO: 0.4 % — SIGNIFICANT CHANGE UP (ref 0–2)
BILIRUB SERPL-MCNC: <0.2 MG/DL
BUN SERPL-MCNC: 12 MG/DL
CALCIUM SERPL-MCNC: 9.6 MG/DL
CHLORIDE SERPL-SCNC: 105 MMOL/L
CO2 SERPL-SCNC: 21 MMOL/L
CREAT SERPL-MCNC: 0.62 MG/DL
EOSINOPHIL # BLD AUTO: 0.1 K/UL — SIGNIFICANT CHANGE UP (ref 0–0.5)
EOSINOPHIL NFR BLD AUTO: 1.5 % — SIGNIFICANT CHANGE UP (ref 0–6)
GLUCOSE SERPL-MCNC: 144 MG/DL
HCT VFR BLD CALC: 29.8 % — LOW (ref 34.5–45)
HGB BLD-MCNC: 9.9 G/DL — LOW (ref 11.5–15.5)
IMM GRANULOCYTES NFR BLD AUTO: 0.6 % — SIGNIFICANT CHANGE UP (ref 0–1.5)
LYMPHOCYTES # BLD AUTO: 2.26 K/UL — SIGNIFICANT CHANGE UP (ref 1–3.3)
LYMPHOCYTES # BLD AUTO: 32.8 % — SIGNIFICANT CHANGE UP (ref 13–44)
MAGNESIUM SERPL-MCNC: 1.6 MG/DL
MCHC RBC-ENTMCNC: 29.6 PG — SIGNIFICANT CHANGE UP (ref 27–34)
MCHC RBC-ENTMCNC: 33.2 GM/DL — SIGNIFICANT CHANGE UP (ref 32–36)
MCV RBC AUTO: 89 FL — SIGNIFICANT CHANGE UP (ref 80–100)
MONOCYTES # BLD AUTO: 0.69 K/UL — SIGNIFICANT CHANGE UP (ref 0–0.9)
MONOCYTES NFR BLD AUTO: 10 % — SIGNIFICANT CHANGE UP (ref 2–14)
NEUTROPHILS # BLD AUTO: 3.77 K/UL — SIGNIFICANT CHANGE UP (ref 1.8–7.4)
NEUTROPHILS NFR BLD AUTO: 54.7 % — SIGNIFICANT CHANGE UP (ref 43–77)
NRBC # BLD: 0 /100 WBCS — SIGNIFICANT CHANGE UP (ref 0–0)
PLATELET # BLD AUTO: 252 K/UL — SIGNIFICANT CHANGE UP (ref 150–400)
POTASSIUM SERPL-SCNC: 5 MMOL/L
PROT SERPL-MCNC: 7.1 G/DL
RBC # BLD: 3.35 M/UL — LOW (ref 3.8–5.2)
RBC # FLD: 17.5 % — HIGH (ref 10.3–14.5)
SODIUM SERPL-SCNC: 139 MMOL/L
WBC # BLD: 6.89 K/UL — SIGNIFICANT CHANGE UP (ref 3.8–10.5)
WBC # FLD AUTO: 6.89 K/UL — SIGNIFICANT CHANGE UP (ref 3.8–10.5)

## 2020-03-17 PROCEDURE — 99215 OFFICE O/P EST HI 40 MIN: CPT

## 2020-03-17 RX ORDER — CLONIDINE HYDROCHLORIDE 0.2 MG/1
0.2 TABLET ORAL
Qty: 180 | Refills: 0 | Status: ACTIVE | COMMUNITY
Start: 2017-03-06 | End: 1900-01-01

## 2020-03-17 RX ORDER — AMLODIPINE BESYLATE 10 MG/1
10 TABLET ORAL
Qty: 90 | Refills: 0 | Status: ACTIVE | COMMUNITY
Start: 2018-10-12 | End: 1900-01-01

## 2020-03-17 RX ORDER — HYDRALAZINE HYDROCHLORIDE 25 MG/1
25 TABLET ORAL 3 TIMES DAILY
Qty: 270 | Refills: 1 | Status: ACTIVE | COMMUNITY
Start: 2019-05-03 | End: 1900-01-01

## 2020-03-17 RX ORDER — CLONIDINE HYDROCHLORIDE 0.3 MG/1
TABLET ORAL
Refills: 0 | Status: DISCONTINUED | COMMUNITY
End: 2020-03-17

## 2020-03-17 RX ORDER — DULOXETINE HYDROCHLORIDE 20 MG/1
20 CAPSULE, DELAYED RELEASE PELLETS ORAL
Qty: 30 | Refills: 5 | Status: ACTIVE | COMMUNITY
Start: 2019-10-25 | End: 1900-01-01

## 2020-03-17 NOTE — ASSESSMENT
[FreeTextEntry1] : She is a 73 y/o F with Stage IA serous uterine carcinoma s/p TLH, BSO, pelvic lymph node sampling and omental nodule excision. She completed adjuvant 6 cycles of treatment on 9/9/19 and completed pelvic RT 2/2020. We reviewed her CT pelvis and peritoneal thickening and reviewed repeat  and PET/ CT. We explained suspicion of progression of uterine cancer and further evaluation. We reviewed supportive measures over the abdominal pain sensation. We will obtain potassium and magnesium along with TFTs, B12 and folic acid to evaluate for other reasons for fatigue. We explained fatigue from prior treatment and cancer. We reviewed her decreased appetite and reviewed calorie dense foods and trying fruit smoothies with protein. We reviewed if cancer confirmed to be recurrent despite chemo and RT, we reviewed supportive measures alone versus consideration of 2nd line chemotherapy and treatment options. She is agreeable to Pet/ CT imaging for further evaluation. \par \par Loose stool diarrhea after RT/ CT contrast; encouraged fluid hydration with magnesium and potassium containing foods. We reviewed potential colitis after RT. \par \par Neuropathy of the hands and feet: improved with duloxetine and will try to taper to every other day. If no worsening symptoms, she will then taper off duloxetine next month. If neuropathy worse, will resume duloxeting. \par \par PAD: she will follow up with vascular for PAD and continue with massage of the legs and feet to help circulation and neuropathy. \par \par HTN: repeat BP improved at end of visit. We renewed her clonidine, hydralazine, and amlodipine.

## 2020-03-17 NOTE — HISTORY OF PRESENT ILLNESS
[Disease: _____________________] : Disease: [unfilled] [T: ___] : T[unfilled] [N: ___] : N[unfilled] [AJCC Stage: ____] : AJCC Stage: [unfilled] [de-identified] : Age 73: Stage IA serous uterine cancer\par She had been having evaluation for PAD with CTA of the abd/ pelvis (mild to moderate stenoses of the R common femoral and superficial femoral arteries and severe stenosis of the distal R superficial femoral artery) and MRI 11/30/18. The MRI showed partially visualized marked abnormal thickening of the endometrium and CTA showing left upper abdominal peritoneal nodules up to 1 cm and indeterminate 1.6 cm adrenal nodule. She underwent endometrial curettage which showed high grade carcinoma with mixed serous and clear cell features involving an endometrial polyp. She had CT of the chest/ abd/ pelvis which again showed stable L adrenal nodule, 6 mm low attenuation left lobe nodule, 1 cm pelvic nodule, and groundglass patchy opacities in the upper lung fields. On 3/15/19, she underwent robotic laparoscopic TLH/ BSO, pelvic and para aortic lymph node sampling and pelvic nodule excision with Dr Mcdowell. The pathology showed serous carcinoma showing superficial invasion of the myometrium, benign pelvic lymph nodes, and fat necrosis with calcification in pelvic nodule. Pelvic wash positive for malignant cells. MSI stable.  She initiated Q 3 week  Carbo/Taxol 5.22.19 and changed to carboplatin due to worsening neuropathy. She had fatigue, diarrhea, and neuropathy with therapy. Interval CT imaging showed new small RP LN with follow up Pet/ CT done 11/2019 showing Pet avid RP LN SUV ranging from 6.3 to 8.1. She was referred to Dr Hallman and started pelvic RT 12/2019 and completed on 2/2020. She had interval CT of the pelvis done on 3/7/2020 which showed small volume ascites and left lower quadrant peritoneal thickening suspicious for carcinomatosis and partially imagined left para-aortic RP LN.  [de-identified] : serous carcinoma  [de-identified] :  on 2/22/19= 143 U/ml  [de-identified] : CARBO/TAXOL 5/22/19 to 6/12/19\par C3 adjusted to single agent carboplatin due to neuropathy symptoms worsening \par carboplatin completed 6 cycles 9/9/19\par pelvic RT with Dr Hallman [de-identified] : She has fatigue that was better after RT but seems to be returning over the last 2 weeks. She gets up in the am and feels she is mainly in the chair. Her family feels this may be due to her PAD but she states less desire to go outside. She has improved numbness over the hands and feels they are recovered on duloxetine: continues to take daily. She tries to eat but has low appetite: her  feels she is not eating enough to have good energy. She denies any nausea or vomiting or abdominal pain after eating. She has loose stool at night every day: no blood but stool is almost like diarrhea but only x 1. She denies any lightheadedness or headaches. She has unsteadiness when she walks. She uses leg massager for 1/2 hour / day and continues to massage her hands. She has pain over the belly button that is worse with palpation but does not need pain medication. She is present with her  to review her CT and supportive care for symptoms. She has been taking her BP medications but running low: she will need refills.

## 2020-03-17 NOTE — PHYSICAL EXAM
[Ambulatory and capable of all self care but unable to carry out any work activities] : Status 2- Ambulatory and capable of all self care but unable to carry out any work activities. Up and about more than 50% of waking hours [Obese] : obese [Normal] : affect appropriate [de-identified] : + BS, tenderness over the periumbilical area; no rebound or guarding  [de-identified] : sensation to soft touch over hands intact; feet continues with numbness

## 2020-03-17 NOTE — CONSULT LETTER
[Dear  ___] : Dear  [unfilled], [Courtesy Letter:] : I had the pleasure of seeing your patient, [unfilled], in my office today. [Please see my note below.] : Please see my note below. [Consult Closing:] : Thank you very much for allowing me to participate in the care of this patient.  If you have any questions, please do not hesitate to contact me. [Sincerely,] : Sincerely, [FreeTextEntry2] : Brittany Mcdowell MD\par [address]\[place] [address] [FreeTextEntry3] : Mikael Briones MD\par Attending\par [facility]\par  [DrDoe  ___] : Dr. PETERSON [DrDoe ___] : Dr. PETERSON

## 2020-03-17 NOTE — REVIEW OF SYSTEMS
[Fever] : no fever [Chills] : no chills [Night Sweats] : no night sweats [Fatigue] : fatigue [Recent Change In Weight] : ~T no recent weight change [Abdominal Pain] : abdominal pain [Vomiting] : no vomiting [Constipation] : no constipation [Diarrhea] : diarrhea [Confused] : no confusion [Dizziness] : no dizziness [Fainting] : no fainting [Difficulty Walking] : no difficulty walking [Negative] : Allergic/Immunologic [FreeTextEntry2] : decreased appetite  [FreeTextEntry7] : BM daily very loose  [de-identified] : numbness over the hands much improved; numbness over the leg is unchanged

## 2020-03-17 NOTE — REASON FOR VISIT
[Follow-Up Visit] : a follow-up [Spouse] : spouse [FreeTextEntry2] : follow up for uterine cancer s/p completion of RT with continued fatigue and periumbilical abdominal pain

## 2020-03-28 ENCOUNTER — APPOINTMENT (OUTPATIENT)
Dept: NUCLEAR MEDICINE | Facility: IMAGING CENTER | Age: 75
End: 2020-03-28
Payer: MEDICARE

## 2020-03-28 ENCOUNTER — OUTPATIENT (OUTPATIENT)
Dept: OUTPATIENT SERVICES | Facility: HOSPITAL | Age: 75
LOS: 1 days | End: 2020-03-28
Payer: MEDICARE

## 2020-03-28 DIAGNOSIS — R53.83 OTHER FATIGUE: ICD-10-CM

## 2020-03-28 DIAGNOSIS — C54.1 MALIGNANT NEOPLASM OF ENDOMETRIUM: ICD-10-CM

## 2020-03-28 DIAGNOSIS — C78.6 SECONDARY MALIGNANT NEOPLASM OF RETROPERITONEUM AND PERITONEUM: ICD-10-CM

## 2020-03-28 PROCEDURE — 78815 PET IMAGE W/CT SKULL-THIGH: CPT | Mod: 26,PS

## 2020-03-28 PROCEDURE — 78815 PET IMAGE W/CT SKULL-THIGH: CPT

## 2020-03-28 PROCEDURE — A9552: CPT

## 2020-03-30 ENCOUNTER — APPOINTMENT (OUTPATIENT)
Dept: HEMATOLOGY ONCOLOGY | Facility: CLINIC | Age: 75
End: 2020-03-30

## 2020-04-13 ENCOUNTER — APPOINTMENT (OUTPATIENT)
Dept: HEMATOLOGY ONCOLOGY | Facility: CLINIC | Age: 75
End: 2020-04-13
Payer: MEDICARE

## 2020-04-13 DIAGNOSIS — C78.00 SECONDARY MALIGNANT NEOPLASM OF UNSPECIFIED LUNG: ICD-10-CM

## 2020-04-13 DIAGNOSIS — R59.9 ENLARGED LYMPH NODES, UNSPECIFIED: ICD-10-CM

## 2020-04-13 DIAGNOSIS — C80.1 SECONDARY MALIGNANT NEOPLASM OF RETROPERITONEUM AND PERITONEUM: ICD-10-CM

## 2020-04-13 DIAGNOSIS — K52.1 TOXIC GASTROENTERITIS AND COLITIS: ICD-10-CM

## 2020-04-13 DIAGNOSIS — R18.0 MALIGNANT ASCITES: ICD-10-CM

## 2020-04-13 DIAGNOSIS — C78.6 SECONDARY MALIGNANT NEOPLASM OF RETROPERITONEUM AND PERITONEUM: ICD-10-CM

## 2020-04-13 DIAGNOSIS — G62.9 POLYNEUROPATHY, UNSPECIFIED: ICD-10-CM

## 2020-04-13 PROCEDURE — 99215 OFFICE O/P EST HI 40 MIN: CPT | Mod: 95

## 2020-04-13 NOTE — REVIEW OF SYSTEMS
[Fatigue] : fatigue [Abdominal Pain] : abdominal pain [Negative] : Allergic/Immunologic [Fever] : no fever [Chills] : no chills [Night Sweats] : no night sweats [Recent Change In Weight] : ~T no recent weight change [Vomiting] : no vomiting [Constipation] : no constipation [Diarrhea] : no diarrhea [Confused] : no confusion [Dizziness] : no dizziness [Fainting] : no fainting [Difficulty Walking] : no difficulty walking [FreeTextEntry2] : decreased appetite  [FreeTextEntry7] : more bloating and distension  [de-identified] : numbness of the hands and legs

## 2020-04-13 NOTE — ASSESSMENT
[FreeTextEntry1] : She is a 73 y/o F with Stage IA serous uterine carcinoma s/p TLH, BSO, pelvic lymph node sampling and omental nodule excision. She completed adjuvant 6 cycles of treatment on 9/9/19 and completed pelvic RT 2/2020. We reviewed her CT pelvis and peritoneal thickening and reviewed PET/ CT results. We reviewed her current symptoms from disease progression. We will obtain u/s of the abdomen and evaluate for worsening ascites given worsening symptoms and small ascites seen on Pet/ CT. We reviewed increasing Glucerna and increasing protein intake to see if energy will improve. We reviewed if no improvement, we would recommend supportive care: pain management. \par \par If she has improvement of clinical symptoms and performance status, we reviewed options of chemotherapy: Doxil versus pembrolizumab / lenvatinib. We reviewed given her concern for side effects, we would favor pembrolizumab and lenvatinib and would give lower doses of lenvatinib. We reviewed treatment would be palliative and would not be curative. \par \par Pain control for abdominal pain: we reviewed trial of tramadol every 6 hrs as needed for pain. We reviewed topical therapy over the abdomen with Salon Pas. Will see if medication could be titrated. \par \par Neuropathy: she continues on duloxetine daily. \par  [Palliative] : Goals of care discussed with patient: Palliative

## 2020-04-13 NOTE — CONSULT LETTER
[Dear  ___] : Dear  [unfilled], [Courtesy Letter:] : I had the pleasure of seeing your patient, [unfilled], in my office today. [Please see my note below.] : Please see my note below. [Consult Closing:] : Thank you very much for allowing me to participate in the care of this patient.  If you have any questions, please do not hesitate to contact me. [Sincerely,] : Sincerely, [FreeTextEntry2] : Brittany Mcdowell MD\par [address]\[place] [address] [FreeTextEntry3] : Mikael Briones MD\par Attending\par [facility]\par  [DrDoe  ___] : Dr. PETERSON

## 2020-04-13 NOTE — HISTORY OF PRESENT ILLNESS
[Home] : at home, [unfilled] , at the time of the visit. [Medical Office: ([facility])___] : at ~his/her~ medical office located in [place] [Family Member] : family member [Patient] : the patient [Self] : self [Disease: _____________________] : Disease: [unfilled] [T: ___] : T[unfilled] [N: ___] : N[unfilled] [AJCC Stage: ____] : AJCC Stage: [unfilled] [de-identified] : Age 73: Stage IA serous uterine cancer\par She had been having evaluation for PAD with CTA of the abd/ pelvis (mild to moderate stenoses of the R common femoral and superficial femoral arteries and severe stenosis of the distal R superficial femoral artery) and MRI 11/30/18. The MRI showed partially visualized marked abnormal thickening of the endometrium and CTA showing left upper abdominal peritoneal nodules up to 1 cm and indeterminate 1.6 cm adrenal nodule. She underwent endometrial curettage which showed high grade carcinoma with mixed serous and clear cell features involving an endometrial polyp. She had CT of the chest/ abd/ pelvis which again showed stable L adrenal nodule, 6 mm low attenuation left lobe nodule, 1 cm pelvic nodule, and groundglass patchy opacities in the upper lung fields. On 3/15/19, she underwent robotic laparoscopic TLH/ BSO, pelvic and para aortic lymph node sampling and pelvic nodule excision with Dr Mcdowell. The pathology showed serous carcinoma showing superficial invasion of the myometrium, benign pelvic lymph nodes, and fat necrosis with calcification in pelvic nodule. Pelvic wash positive for malignant cells. MSI stable.  She initiated Q 3 week  Carbo/Taxol 5.22.19 and changed to carboplatin due to worsening neuropathy. She had fatigue, diarrhea, and neuropathy with therapy. Interval CT imaging showed new small RP LN with follow up Pet/ CT done 11/2019 showing Pet avid RP LN SUV ranging from 6.3 to 8.1. She was referred to Dr Hallman and started pelvic RT 12/2019 and completed on 2/2020. She had interval CT of the pelvis done on 3/7/2020 which showed small volume ascites and left lower quadrant peritoneal thickening suspicious for carcinomatosis and partially imagined left para-aortic RP LN.  [de-identified] : serous carcinoma  [de-identified] :  on 2/22/19= 143 U/ml  [de-identified] : CARBO/TAXOL 5/22/19 to 6/12/19\par C3 adjusted to single agent carboplatin due to neuropathy symptoms worsening \par carboplatin completed 6 cycles 9/9/19\par pelvic RT with Dr Hallman [de-identified] : Since last evaluation a month ago, she has more abdominal distension and bloating. Feels low appetite and only has craving for sweet foods. She is drinking 1 Glucerna a day and her family has been giving protein powder with oat milk daily. She has chronic numbness of the hands and feet. She is still taking duloxetine daily. She has abdominal pain 8/10 sharp sensation that is not improved with Tylenol. Her family feels she is in bed most of the day. She denies any nausea or vomiting.

## 2020-04-13 NOTE — REASON FOR VISIT
[Follow-Up Visit] : a follow-up [Family Member] : family member [FreeTextEntry2] : follow up for metastatic uterine cancer

## 2020-04-16 ENCOUNTER — LABORATORY RESULT (OUTPATIENT)
Age: 75
End: 2020-04-16

## 2020-04-17 ENCOUNTER — OUTPATIENT (OUTPATIENT)
Dept: OUTPATIENT SERVICES | Facility: HOSPITAL | Age: 75
LOS: 1 days | End: 2020-04-17
Payer: MEDICARE

## 2020-04-17 ENCOUNTER — RESULT REVIEW (OUTPATIENT)
Age: 75
End: 2020-04-17

## 2020-04-17 ENCOUNTER — APPOINTMENT (OUTPATIENT)
Dept: ULTRASOUND IMAGING | Facility: IMAGING CENTER | Age: 75
End: 2020-04-17
Payer: MEDICARE

## 2020-04-17 DIAGNOSIS — R18.0 MALIGNANT ASCITES: ICD-10-CM

## 2020-04-17 DIAGNOSIS — C54.1 MALIGNANT NEOPLASM OF ENDOMETRIUM: ICD-10-CM

## 2020-04-17 PROCEDURE — 49083 ABD PARACENTESIS W/IMAGING: CPT

## 2020-04-19 ENCOUNTER — APPOINTMENT (OUTPATIENT)
Dept: HEMATOLOGY ONCOLOGY | Facility: CLINIC | Age: 75
End: 2020-04-19
Payer: MEDICARE

## 2020-04-19 DIAGNOSIS — C54.1 MALIGNANT NEOPLASM OF ENDOMETRIUM: ICD-10-CM

## 2020-04-19 PROCEDURE — 99442: CPT

## 2020-04-20 RX ORDER — HYDROMORPHONE HYDROCHLORIDE 2 MG/1
2 TABLET ORAL
Qty: 25 | Refills: 0 | Status: ACTIVE | COMMUNITY
Start: 2020-04-20 | End: 1900-01-01

## 2020-04-20 RX ORDER — METOCLOPRAMIDE 10 MG/1
10 TABLET ORAL EVERY 8 HOURS
Qty: 90 | Refills: 1 | Status: ACTIVE | COMMUNITY
Start: 2020-04-20 | End: 1900-01-01

## 2020-04-20 RX ORDER — PROCHLORPERAZINE MALEATE 5 MG/1
5 TABLET ORAL
Qty: 42 | Refills: 0 | Status: DISCONTINUED | COMMUNITY
Start: 2020-01-10 | End: 2020-04-20

## 2020-04-22 RX ORDER — IBUPROFEN 800 MG
TABLET ORAL
Refills: 0 | Status: DISCONTINUED | COMMUNITY
End: 2020-04-22

## 2020-04-22 RX ORDER — CHOLECALCIFEROL (VITAMIN D3) 25 MCG
TABLET ORAL
Refills: 0 | Status: DISCONTINUED | COMMUNITY
End: 2020-04-22

## 2020-04-22 RX ORDER — CHLORTHALIDONE 25 MG/1
25 TABLET ORAL DAILY
Qty: 90 | Refills: 1 | Status: DISCONTINUED | COMMUNITY
Start: 2019-05-03 | End: 2020-04-22

## 2020-04-26 PROBLEM — C54.1 ENDOMETRIAL CANCER: Status: ACTIVE | Noted: 2019-02-14

## 2020-04-27 RX ORDER — TRAMADOL HYDROCHLORIDE 50 MG/1
50 TABLET, COATED ORAL
Qty: 40 | Refills: 0 | Status: ACTIVE | COMMUNITY
Start: 2020-04-13 | End: 1900-01-01

## 2020-05-08 ENCOUNTER — OUTPATIENT (OUTPATIENT)
Dept: OUTPATIENT SERVICES | Facility: HOSPITAL | Age: 75
LOS: 1 days | Discharge: HOME | End: 2020-05-08
Payer: OTHER MISCELLANEOUS

## 2020-05-08 ENCOUNTER — APPOINTMENT (OUTPATIENT)
Dept: ULTRASOUND IMAGING | Facility: HOSPITAL | Age: 75
End: 2020-05-08

## 2020-05-08 ENCOUNTER — APPOINTMENT (OUTPATIENT)
Dept: INTERVENTIONAL RADIOLOGY/VASCULAR | Facility: HOSPITAL | Age: 75
End: 2020-05-08

## 2020-05-08 DIAGNOSIS — R79.89 OTHER SPECIFIED ABNORMAL FINDINGS OF BLOOD CHEMISTRY: ICD-10-CM
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
## 2020-05-08 DIAGNOSIS — T81.89XA OTHER COMPLICATIONS OF PROCEDURES, NOT ELSEWHERE CLASSIFIED, INITIAL ENCOUNTER: ICD-10-CM

## 2020-05-08 PROCEDURE — 49083 ABD PARACENTESIS W/IMAGING: CPT

## 2020-05-26 NOTE — H&P PST ADULT - PSH
ambulatory
Left femoral poplteal bypass graft 11/2011    PVD (Peripheral Vascular Disease)  angiogram of left lower extremity in 2010, nov 2011  S/P Appendectomy  35 years ago

## 2020-06-08 ENCOUNTER — RX RENEWAL (OUTPATIENT)
Age: 75
End: 2020-06-08

## 2020-12-31 PROBLEM — R59.9 ADENOPATHY: Status: ACTIVE | Noted: 2020-04-13

## 2021-07-27 NOTE — H&P PST ADULT - VENOUS THROMBOEMBOLISM CURRENT STATUS
Bed: 43  Expected date:   Expected time:   Means of arrival:   Comments:  [place]: Shoulder Deformity   (2) malignancy (present or previous)

## 2022-03-14 NOTE — DISCHARGE NOTE NURSING/CASE MANAGEMENT/SOCIAL WORK - NSDCPECAREGIVERED_GEN_ALL_CORE
Health Maintenance Due   Topic Date Due   • Annual Physical (ages 3-18)  Never done       Patient is up to date, no discussion needed.   No

## 2022-04-11 PROBLEM — Z11.59 SCREENING FOR VIRAL DISEASE: Status: RESOLVED | Noted: 2019-04-13 | Resolved: 2022-04-11

## 2022-06-07 NOTE — H&P PST ADULT - NS PRO LACT YNNA
Abnormal Test Results     Name: Cassi ACL    Ordering Provider: Dr Eli    Test Name: CMP    Test Date: 6/7/22 at 0900    Test Value: Potassium 6.3    Test Normal Range: 3.4-5.1    Caller Information       Type Contact Phone    06/07/2022 05:38 PM CDT Phone (Incoming) ACL  [phone]    06/07/2022 05:41 PM CDT Phone (Incoming) Cassi ACL (Lab) [phone]     Critical lab warm transferred to RN    06/07/2022 05:50 PM CDT Page (Outgoing) Dr Eli (Provider)      High priority page sent        -------------------------------------------------------  Is the patient having symptoms?: known   1800 Dr Eli updated on K+ 6.3  Medical advice is: No orders received, Dr Eli states he has already spoken to the patient with disposition to go to ED.    Notified patient/family/guardian of the medical advice from MD regarding the critical lab above and is aware that MD was notified of the lab results.  Verbalized understanding of above.  Reason for Disposition  • Lab or radiology calling with CRITICAL test results    Protocols used: PCP CALL - NO TRIAGE-A-     no

## 2023-05-09 NOTE — ASU DISCHARGE PLAN (ADULT/PEDIATRIC). - DISCHARGE DATE
30-Jan-2019 10:49 Azelaic Acid Pregnancy And Lactation Text: This medication is considered safe during pregnancy and breast feeding.

## 2024-01-01 NOTE — PRE-ANESTHESIA EVALUATION ADULT - NSANTHOSAYNRD_GEN_A_CORE
No. DESIRAE screening performed.  STOP BANG Legend: 0-2 = LOW Risk; 3-4 = INTERMEDIATE Risk; 5-8 = HIGH Risk
Statement Selected

## 2025-07-07 NOTE — ED PROCEDURE NOTE - CPROC ED INFORMED CONSENT1
Benefits, risks, and possible complications of procedure explained to patient/caregiver who verbalized understanding and gave verbal consent.
Malignant neoplasm of endometrium